# Patient Record
Sex: MALE | Race: WHITE | NOT HISPANIC OR LATINO | URBAN - NONMETROPOLITAN AREA
[De-identification: names, ages, dates, MRNs, and addresses within clinical notes are randomized per-mention and may not be internally consistent; named-entity substitution may affect disease eponyms.]

---

## 2022-01-13 ENCOUNTER — OFFICE VISIT (OUTPATIENT)
Dept: URBAN - NONMETROPOLITAN AREA CLINIC 2 | Facility: CLINIC | Age: 37
End: 2022-01-13
Payer: COMMERCIAL

## 2022-01-13 ENCOUNTER — WEB ENCOUNTER (OUTPATIENT)
Dept: URBAN - NONMETROPOLITAN AREA CLINIC 2 | Facility: CLINIC | Age: 37
End: 2022-01-13

## 2022-01-13 DIAGNOSIS — R10.11 RIGHT UPPER QUADRANT ABDOMINAL PAIN: ICD-10-CM

## 2022-01-13 DIAGNOSIS — R93.3 ABNORMAL CT SCAN, SIGMOID COLON: ICD-10-CM

## 2022-01-13 DIAGNOSIS — K92.1 HEMATOCHEZIA: ICD-10-CM

## 2022-01-13 PROCEDURE — 99204 OFFICE O/P NEW MOD 45 MIN: CPT | Performed by: INTERNAL MEDICINE

## 2022-01-13 RX ORDER — ONDANSETRON 4 MG/1
PLACE ONE TABLET ON THE TONGUE THREE TIMES A DAY AS NEEDED FOR NAUSEA TABLET, ORALLY DISINTEGRATING ORAL
Qty: 30 | Refills: 0 | Status: ACTIVE | COMMUNITY

## 2022-01-13 RX ORDER — CIPROFLOXACIN 500 MG/1
TAKE ONE TABLET BY MOUTH TWICE A DAY FOR 7 DAYS TABLET, FILM COATED ORAL
Qty: 14 | Refills: 0 | Status: ACTIVE | COMMUNITY

## 2022-01-13 RX ORDER — AMLODIPINE BESYLATE 10 MG/1
TAKE ONE TABLET BY MOUTH ONE TIME DAILY TABLET ORAL
Qty: 90 | Refills: 0 | Status: ACTIVE | COMMUNITY

## 2022-01-13 RX ORDER — IBUPROFEN 800 MG/1
TAKE ONE TABLET BY MOUTH EVERY 8 HOURS AS NEEDED TABLET, FILM COATED ORAL
Qty: 15 | Refills: 0 | Status: ACTIVE | COMMUNITY

## 2022-01-13 RX ORDER — PREDNISONE 20 MG/1
2 TABLETS TABLET ORAL ONCE A DAY
Qty: 60 TABLET | Refills: 3 | OUTPATIENT
Start: 2022-01-13 | End: 2022-05-13

## 2022-01-13 RX ORDER — METRONIDAZOLE 500 MG/1
1 TABLET TABLET ORAL THREE TIMES A DAY
Qty: 30 | OUTPATIENT
Start: 2022-01-13 | End: 2022-01-23

## 2022-01-13 RX ORDER — HYDROCODONE BITARTRATE AND ACETAMINOPHEN 7.5; 325 MG/1; MG/1
TAKE ONE TABLET BY MOUTH EVERY 4 HOURS AS NEEDED PAIN TABLET ORAL
Qty: 15 | Refills: 0 | Status: ACTIVE | COMMUNITY

## 2022-01-13 RX ORDER — CIPROFLOXACIN HYDROCHLORIDE 500 MG/1
1 TABLET TABLET, FILM COATED ORAL
Qty: 20 | OUTPATIENT
Start: 2022-01-13 | End: 2022-01-23

## 2022-01-13 RX ORDER — PREDNISONE 20 MG/1
TAKE ONE TABLET BY MOUTH ONE TIME DAILY FOR 5 DAYS TABLET ORAL
Qty: 5 | Refills: 0 | Status: ACTIVE | COMMUNITY

## 2022-01-13 RX ORDER — TIZANIDINE 4 MG/1
TAKE ONE TABLET BY MOUTH AT BEDTIME TABLET ORAL
Qty: 90 | Refills: 0 | Status: ACTIVE | COMMUNITY

## 2022-01-13 RX ORDER — PROMETHAZINE HYDROCHLORIDE 25 MG/1
TAKE ONE TABLET BY MOUTH EVERY 6 HOURS AS NEEDED TABLET ORAL
Qty: 20 | Refills: 0 | Status: ACTIVE | COMMUNITY

## 2022-01-13 RX ORDER — SUCRALFATE 1 G/1
TAKE ONE TABLET BY MOUTH FOUR TIMES A DAY WITH MEALS AND NIGHTLY FOR 14 DAYS TABLET ORAL
Qty: 56 | Refills: 0 | Status: ACTIVE | COMMUNITY

## 2022-01-13 RX ORDER — METRONIDAZOLE 500 MG/1
TAKE ONE TABLET BY MOUTH THREE TIMES A DAY TABLET, FILM COATED ORAL
Qty: 21 | Refills: 0 | Status: ACTIVE | COMMUNITY

## 2022-01-13 RX ORDER — PANTOPRAZOLE SODIUM 40 MG/1
TAKE ONE TABLET BY MOUTH ONE TIME DAILY TABLET, DELAYED RELEASE ORAL
Qty: 30 | Refills: 0 | Status: ACTIVE | COMMUNITY

## 2022-01-13 RX ORDER — AZITHROMYCIN 250 MG/1
TAKE TWO TABLETS BY MOUTH ON DAY 1, THEN TAKE ONE TABLET ONE TIME DAILY ON DAYS 2-5 TABLET, FILM COATED ORAL
Qty: 6 | Refills: 0 | Status: ACTIVE | COMMUNITY

## 2022-01-13 RX ORDER — LISINOPRIL 10 MG/1
TAKE ONE TABLET BY MOUTH ONE TIME DAILY TABLET ORAL
Qty: 90 | Refills: 0 | Status: ACTIVE | COMMUNITY

## 2022-01-13 NOTE — HPI-TODAY'S VISIT:
Mr. Lance Fletcher is a 35 y/o M referred to GI clinic by [facility] ER for evaluation of abdominal pain, hematochezia, and abnormal CT scan with possible sigmoid colitis.  He presented there in mid October and November with rectal bleeding.  He was given antibiotics which he reports helps for a while but symptoms tend to recur.  Currently he is having blood in almost all his bowel movements and right sided abdominal pain.  He is moving his bowels fairly frequently, sometimes as much as 4-5x daily and they are loose.  Labs in the ER showed normal CBC and CMP.

## 2022-01-26 ENCOUNTER — OFFICE VISIT (OUTPATIENT)
Dept: URBAN - METROPOLITAN AREA MEDICAL CENTER 1 | Facility: MEDICAL CENTER | Age: 37
End: 2022-01-26
Payer: COMMERCIAL

## 2022-01-26 DIAGNOSIS — R93.3 ABN FINDINGS-GI TRACT: ICD-10-CM

## 2022-01-26 DIAGNOSIS — K31.89 ACQUIRED DEFORMITY OF DUODENUM: ICD-10-CM

## 2022-01-26 DIAGNOSIS — K62.1 ANAL AND RECTAL POLYP: ICD-10-CM

## 2022-01-26 DIAGNOSIS — K62.5 ANAL BLEEDING: ICD-10-CM

## 2022-01-26 DIAGNOSIS — K20.80 ESOPHAGITIS DISSECANS SUPERFICIALIS: ICD-10-CM

## 2022-01-26 DIAGNOSIS — K63.89 BACTERIAL OVERGROWTH SYNDROME: ICD-10-CM

## 2022-01-26 DIAGNOSIS — K29.60 ADENOPAPILLOMATOSIS GASTRICA: ICD-10-CM

## 2022-01-26 DIAGNOSIS — R19.7 ACUTE DIARRHEA: ICD-10-CM

## 2022-01-26 PROCEDURE — 43239 EGD BIOPSY SINGLE/MULTIPLE: CPT | Performed by: INTERNAL MEDICINE

## 2022-01-26 PROCEDURE — 45380 COLONOSCOPY AND BIOPSY: CPT | Performed by: INTERNAL MEDICINE

## 2022-01-26 PROCEDURE — 45385 COLONOSCOPY W/LESION REMOVAL: CPT | Performed by: INTERNAL MEDICINE

## 2022-01-28 ENCOUNTER — TELEPHONE ENCOUNTER (OUTPATIENT)
Dept: URBAN - NONMETROPOLITAN AREA CLINIC 2 | Facility: CLINIC | Age: 37
End: 2022-01-28

## 2022-02-14 ENCOUNTER — TELEPHONE ENCOUNTER (OUTPATIENT)
Dept: URBAN - NONMETROPOLITAN AREA CLINIC 2 | Facility: CLINIC | Age: 37
End: 2022-02-14

## 2022-02-14 RX ORDER — HYDROCORTISONE ACETATE 25 MG/1
1 SUPPOSITORY SUPPOSITORY RECTAL ONCE A DAY
Qty: 14 SUPPOSITORY | Refills: 2 | OUTPATIENT
Start: 2022-02-14 | End: 2022-03-28

## 2022-02-15 ENCOUNTER — WEB ENCOUNTER (OUTPATIENT)
Dept: URBAN - NONMETROPOLITAN AREA CLINIC 2 | Facility: CLINIC | Age: 37
End: 2022-02-15

## 2022-02-16 ENCOUNTER — WEB ENCOUNTER (OUTPATIENT)
Dept: URBAN - NONMETROPOLITAN AREA CLINIC 2 | Facility: CLINIC | Age: 37
End: 2022-02-16

## 2022-02-17 ENCOUNTER — WEB ENCOUNTER (OUTPATIENT)
Dept: URBAN - NONMETROPOLITAN AREA CLINIC 2 | Facility: CLINIC | Age: 37
End: 2022-02-17

## 2022-02-18 ENCOUNTER — WEB ENCOUNTER (OUTPATIENT)
Dept: URBAN - NONMETROPOLITAN AREA CLINIC 2 | Facility: CLINIC | Age: 37
End: 2022-02-18

## 2022-02-21 ENCOUNTER — TELEPHONE ENCOUNTER (OUTPATIENT)
Dept: URBAN - NONMETROPOLITAN AREA CLINIC 2 | Facility: CLINIC | Age: 37
End: 2022-02-21

## 2022-02-21 ENCOUNTER — WEB ENCOUNTER (OUTPATIENT)
Dept: URBAN - NONMETROPOLITAN AREA CLINIC 2 | Facility: CLINIC | Age: 37
End: 2022-02-21

## 2022-02-22 ENCOUNTER — WEB ENCOUNTER (OUTPATIENT)
Dept: URBAN - NONMETROPOLITAN AREA CLINIC 2 | Facility: CLINIC | Age: 37
End: 2022-02-22

## 2022-02-24 ENCOUNTER — LAB OUTSIDE AN ENCOUNTER (OUTPATIENT)
Dept: URBAN - NONMETROPOLITAN AREA CLINIC 2 | Facility: CLINIC | Age: 37
End: 2022-02-24

## 2022-03-01 ENCOUNTER — WEB ENCOUNTER (OUTPATIENT)
Dept: URBAN - NONMETROPOLITAN AREA CLINIC 2 | Facility: CLINIC | Age: 37
End: 2022-03-01

## 2022-03-01 ENCOUNTER — WEB ENCOUNTER (OUTPATIENT)
Dept: URBAN - METROPOLITAN AREA CLINIC 92 | Facility: CLINIC | Age: 37
End: 2022-03-01

## 2022-03-01 LAB
C. DIFFICILE TOXIN A/B, STOOL - QDX: NEGATIVE
GASTROINTESTINAL PATHOGEN: (no result)
OVA AND PARASITE - QDX: (no result)

## 2022-03-04 ENCOUNTER — OFFICE VISIT (OUTPATIENT)
Dept: URBAN - NONMETROPOLITAN AREA CLINIC 2 | Facility: CLINIC | Age: 37
End: 2022-03-04
Payer: COMMERCIAL

## 2022-03-04 VITALS
DIASTOLIC BLOOD PRESSURE: 79 MMHG | TEMPERATURE: 97 F | HEIGHT: 70 IN | SYSTOLIC BLOOD PRESSURE: 130 MMHG | WEIGHT: 229.8 LBS | BODY MASS INDEX: 32.9 KG/M2 | HEART RATE: 93 BPM

## 2022-03-04 DIAGNOSIS — R11.0 NAUSEA: ICD-10-CM

## 2022-03-04 DIAGNOSIS — K52.9 COLITIS: ICD-10-CM

## 2022-03-04 DIAGNOSIS — E72.20 HYPERAMMONEMIA: ICD-10-CM

## 2022-03-04 DIAGNOSIS — R10.9 FLANK PAIN: ICD-10-CM

## 2022-03-04 PROCEDURE — 99214 OFFICE O/P EST MOD 30 MIN: CPT | Performed by: NURSE PRACTITIONER

## 2022-03-04 RX ORDER — PANTOPRAZOLE SODIUM 40 MG/1
1 TABLET TABLET, DELAYED RELEASE ORAL TWICE DAILY
Qty: 60 TABLET | Refills: 6 | OUTPATIENT
Start: 2022-03-04

## 2022-03-04 RX ORDER — HYDROCORTISONE ACETATE 25 MG/1
1 SUPPOSITORY SUPPOSITORY RECTAL ONCE A DAY
Qty: 14 SUPPOSITORY | Refills: 2 | Status: ACTIVE | COMMUNITY
Start: 2022-02-14 | End: 2022-03-28

## 2022-03-04 RX ORDER — TIZANIDINE 4 MG/1
TAKE ONE TABLET BY MOUTH AT BEDTIME TABLET ORAL
Qty: 90 | Refills: 0 | Status: ACTIVE | COMMUNITY

## 2022-03-04 RX ORDER — LISINOPRIL 10 MG/1
TAKE ONE TABLET BY MOUTH ONE TIME DAILY TABLET ORAL
Qty: 90 | Refills: 0 | Status: ACTIVE | COMMUNITY

## 2022-03-04 RX ORDER — AZITHROMYCIN 250 MG/1
TAKE TWO TABLETS BY MOUTH ON DAY 1, THEN TAKE ONE TABLET ONE TIME DAILY ON DAYS 2-5 TABLET, FILM COATED ORAL
Qty: 6 | Refills: 0 | Status: ACTIVE | COMMUNITY

## 2022-03-04 RX ORDER — IBUPROFEN 800 MG/1
TAKE ONE TABLET BY MOUTH EVERY 8 HOURS AS NEEDED TABLET, FILM COATED ORAL
Qty: 15 | Refills: 0 | Status: ACTIVE | COMMUNITY

## 2022-03-04 RX ORDER — CYCLOBENZAPRINE HYDROCHLORIDE 5 MG/1
TAKE ONCE DAILY PRN SIDE/BACK PAIN TABLET, FILM COATED ORAL ONCE A DAY
Qty: 30 | Refills: 1 | OUTPATIENT

## 2022-03-04 RX ORDER — PANTOPRAZOLE SODIUM 40 MG/1
TAKE ONE TABLET BY MOUTH ONE TIME DAILY TABLET, DELAYED RELEASE ORAL
Qty: 30 | Refills: 0 | Status: ACTIVE | COMMUNITY

## 2022-03-04 RX ORDER — ONDANSETRON 4 MG/1
PLACE ONE TABLET ON THE TONGUE THREE TIMES A DAY AS NEEDED FOR NAUSEA TABLET, ORALLY DISINTEGRATING ORAL
Qty: 30 | Refills: 0 | Status: ACTIVE | COMMUNITY

## 2022-03-04 RX ORDER — AMITRIPTYLINE HYDROCHLORIDE 10 MG/1
1 TABLET AT BEDTIME TABLET, FILM COATED ORAL ONCE A DAY
Qty: 30 | OUTPATIENT
Start: 2022-03-04

## 2022-03-04 RX ORDER — PROMETHAZINE HYDROCHLORIDE 25 MG/1
TAKE ONE TABLET BY MOUTH EVERY 6 HOURS AS NEEDED TABLET ORAL
Qty: 20 | Refills: 0 | Status: ACTIVE | COMMUNITY

## 2022-03-04 RX ORDER — PREDNISONE 20 MG/1
2 TABLETS TABLET ORAL ONCE A DAY
Qty: 60 TABLET | Refills: 3 | Status: ACTIVE | COMMUNITY
Start: 2022-01-13 | End: 2022-05-13

## 2022-03-04 RX ORDER — HYDROCODONE BITARTRATE AND ACETAMINOPHEN 7.5; 325 MG/1; MG/1
TAKE ONE TABLET BY MOUTH EVERY 4 HOURS AS NEEDED PAIN TABLET ORAL
Qty: 15 | Refills: 0 | Status: ACTIVE | COMMUNITY

## 2022-03-04 RX ORDER — SUCRALFATE 1 G/1
TAKE ONE TABLET BY MOUTH FOUR TIMES A DAY WITH MEALS AND NIGHTLY FOR 14 DAYS TABLET ORAL
Qty: 56 | Refills: 0 | Status: ACTIVE | COMMUNITY

## 2022-03-04 RX ORDER — AMLODIPINE BESYLATE 10 MG/1
TAKE ONE TABLET BY MOUTH ONE TIME DAILY TABLET ORAL
Qty: 90 | Refills: 0 | Status: ACTIVE | COMMUNITY

## 2022-03-04 NOTE — HPI-TODAY'S VISIT:
Consult: Mr. Lance Fletcher is a 35 y/o M referred to GI clinic by [facility] ER for evaluation of abdominal pain, hematochezia, and abnormal CT scan with possible sigmoid colitis.  He presented there in mid October and November with rectal bleeding.  He was given antibiotics which he reports helps for a while but symptoms tend to recur.  Currently he is having blood in almost all his bowel movements and right sided abdominal pain.  He is moving his bowels fairly frequently, sometimes as much as 4-5x daily and they are loose.  Labs in the ER showed normal CBC and CMP. 3/4/2022 Lance returns for follow-up for colitis.  Since his last office visit, he is gone to the ED as well as had some urgent care visits with a negative STD and hepatitis work-up.  He does report during one ER visit, he had an elevated ammonia 46.  He continues to have intermittent bouts of rectal bleeding with loose stools.  This will be accompanied by some flank pain and brain fog as well as nausea.  Symptoms resolved with antibiotics.  Most of his brain fog and nausea seem to be related to antibiotics. Sb

## 2022-03-05 ENCOUNTER — WEB ENCOUNTER (OUTPATIENT)
Dept: URBAN - NONMETROPOLITAN AREA CLINIC 2 | Facility: CLINIC | Age: 37
End: 2022-03-05

## 2022-03-05 LAB
A/G RATIO: 2.6
ALBUMIN: 5
ALKALINE PHOSPHATASE: 72
ALT (SGPT): 60
AMMONIA, PLASMA: 52
AST (SGOT): 27
BILIRUBIN, TOTAL: 0.3
BUN/CREATININE RATIO: 18
BUN: 16
CALCIUM: 9.9
CARBON DIOXIDE, TOTAL: 18
CHLORIDE: 102
CREATININE: 0.9
EGFR: 114
GLOBULIN, TOTAL: 1.9
GLUCOSE: 132
POTASSIUM: 4.7
PROTEIN, TOTAL: 6.9
SODIUM: 136

## 2022-03-06 ENCOUNTER — WEB ENCOUNTER (OUTPATIENT)
Dept: URBAN - NONMETROPOLITAN AREA CLINIC 2 | Facility: CLINIC | Age: 37
End: 2022-03-06

## 2022-03-07 ENCOUNTER — TELEPHONE ENCOUNTER (OUTPATIENT)
Dept: URBAN - METROPOLITAN AREA CLINIC 23 | Facility: CLINIC | Age: 37
End: 2022-03-07

## 2022-03-08 ENCOUNTER — WEB ENCOUNTER (OUTPATIENT)
Dept: URBAN - NONMETROPOLITAN AREA CLINIC 2 | Facility: CLINIC | Age: 37
End: 2022-03-08

## 2022-03-09 ENCOUNTER — WEB ENCOUNTER (OUTPATIENT)
Dept: URBAN - NONMETROPOLITAN AREA CLINIC 2 | Facility: CLINIC | Age: 37
End: 2022-03-09

## 2022-03-09 ENCOUNTER — WEB ENCOUNTER (OUTPATIENT)
Dept: URBAN - NONMETROPOLITAN AREA CLINIC 13 | Facility: CLINIC | Age: 37
End: 2022-03-09

## 2022-03-18 ENCOUNTER — WEB ENCOUNTER (OUTPATIENT)
Dept: URBAN - NONMETROPOLITAN AREA CLINIC 2 | Facility: CLINIC | Age: 37
End: 2022-03-18

## 2022-03-19 ENCOUNTER — WEB ENCOUNTER (OUTPATIENT)
Dept: URBAN - NONMETROPOLITAN AREA CLINIC 2 | Facility: CLINIC | Age: 37
End: 2022-03-19

## 2022-03-21 ENCOUNTER — WEB ENCOUNTER (OUTPATIENT)
Dept: URBAN - NONMETROPOLITAN AREA CLINIC 2 | Facility: CLINIC | Age: 37
End: 2022-03-21

## 2022-03-21 RX ORDER — METRONIDAZOLE 500 MG/1
1 TABLET TABLET, FILM COATED ORAL THREE TIMES A DAY
Qty: 42 TABLET | Refills: 0 | OUTPATIENT
Start: 2022-03-23 | End: 2022-04-06

## 2022-03-21 RX ORDER — CIPROFLOXACIN HYDROCHLORIDE 500 MG/1
1 TABLET TABLET, FILM COATED ORAL
Qty: 28 TABLET | Refills: 0 | OUTPATIENT
Start: 2022-03-23 | End: 2022-04-06

## 2022-03-23 ENCOUNTER — WEB ENCOUNTER (OUTPATIENT)
Dept: URBAN - NONMETROPOLITAN AREA CLINIC 2 | Facility: CLINIC | Age: 37
End: 2022-03-23

## 2022-03-24 ENCOUNTER — WEB ENCOUNTER (OUTPATIENT)
Dept: URBAN - NONMETROPOLITAN AREA CLINIC 2 | Facility: CLINIC | Age: 37
End: 2022-03-24

## 2022-03-25 ENCOUNTER — WEB ENCOUNTER (OUTPATIENT)
Dept: URBAN - NONMETROPOLITAN AREA CLINIC 2 | Facility: CLINIC | Age: 37
End: 2022-03-25

## 2022-03-26 ENCOUNTER — WEB ENCOUNTER (OUTPATIENT)
Dept: URBAN - NONMETROPOLITAN AREA CLINIC 2 | Facility: CLINIC | Age: 37
End: 2022-03-26

## 2022-03-27 ENCOUNTER — WEB ENCOUNTER (OUTPATIENT)
Dept: URBAN - NONMETROPOLITAN AREA CLINIC 2 | Facility: CLINIC | Age: 37
End: 2022-03-27

## 2022-03-30 LAB
C DIFFICILE TOXIN GENE NAA: NEGATIVE
C DIFFICILE TOXINS A+B, EIA: NEGATIVE
CALPROTECTIN, FECAL: 613
CAMPYLOBACTER CULTURE: (no result)
E COLI SHIGA TOXIN EIA: NEGATIVE
Lab: (no result)
Lab: (no result)
SALMONELLA/SHIGELLA SCREEN: (no result)

## 2022-03-31 ENCOUNTER — WEB ENCOUNTER (OUTPATIENT)
Dept: URBAN - NONMETROPOLITAN AREA CLINIC 2 | Facility: CLINIC | Age: 37
End: 2022-03-31

## 2022-03-31 ENCOUNTER — TELEPHONE ENCOUNTER (OUTPATIENT)
Dept: URBAN - METROPOLITAN AREA CLINIC 92 | Facility: CLINIC | Age: 37
End: 2022-03-31

## 2022-03-31 RX ORDER — MESALAMINE 375 MG/1
4 CAPSULES IN THE MORNING CAPSULE, EXTENDED RELEASE ORAL ONCE A DAY
Qty: 120 | OUTPATIENT
Start: 2022-03-31 | End: 2022-04-30

## 2022-04-02 ENCOUNTER — WEB ENCOUNTER (OUTPATIENT)
Dept: URBAN - NONMETROPOLITAN AREA CLINIC 2 | Facility: CLINIC | Age: 37
End: 2022-04-02

## 2022-04-03 ENCOUNTER — WEB ENCOUNTER (OUTPATIENT)
Dept: URBAN - NONMETROPOLITAN AREA CLINIC 2 | Facility: CLINIC | Age: 37
End: 2022-04-03

## 2022-04-04 ENCOUNTER — WEB ENCOUNTER (OUTPATIENT)
Dept: URBAN - NONMETROPOLITAN AREA CLINIC 2 | Facility: CLINIC | Age: 37
End: 2022-04-04

## 2022-04-05 ENCOUNTER — WEB ENCOUNTER (OUTPATIENT)
Dept: URBAN - NONMETROPOLITAN AREA CLINIC 2 | Facility: CLINIC | Age: 37
End: 2022-04-05

## 2022-04-06 ENCOUNTER — WEB ENCOUNTER (OUTPATIENT)
Dept: URBAN - NONMETROPOLITAN AREA CLINIC 2 | Facility: CLINIC | Age: 37
End: 2022-04-06

## 2022-04-14 ENCOUNTER — OFFICE VISIT (OUTPATIENT)
Dept: URBAN - NONMETROPOLITAN AREA CLINIC 2 | Facility: CLINIC | Age: 37
End: 2022-04-14
Payer: COMMERCIAL

## 2022-04-14 DIAGNOSIS — R10.9 FLANK PAIN: ICD-10-CM

## 2022-04-14 DIAGNOSIS — K52.9 COLITIS: ICD-10-CM

## 2022-04-14 DIAGNOSIS — R11.0 NAUSEA: ICD-10-CM

## 2022-04-14 DIAGNOSIS — E72.20 HYPERAMMONEMIA: ICD-10-CM

## 2022-04-14 PROBLEM — 9360008: Status: ACTIVE | Noted: 2022-03-04

## 2022-04-14 PROCEDURE — 99214 OFFICE O/P EST MOD 30 MIN: CPT | Performed by: INTERNAL MEDICINE

## 2022-04-14 RX ORDER — CYCLOBENZAPRINE HYDROCHLORIDE 5 MG/1
TAKE ONCE DAILY PRN SIDE/BACK PAIN TABLET, FILM COATED ORAL ONCE A DAY
Qty: 30 | Refills: 1 | Status: ACTIVE | COMMUNITY

## 2022-04-14 RX ORDER — LISINOPRIL 10 MG/1
TAKE ONE TABLET BY MOUTH ONE TIME DAILY TABLET ORAL
Qty: 90 | Refills: 0 | Status: ACTIVE | COMMUNITY

## 2022-04-14 RX ORDER — PANTOPRAZOLE SODIUM 40 MG/1
1 TABLET TABLET, DELAYED RELEASE ORAL TWICE DAILY
Qty: 60 TABLET | Refills: 6 | Status: ACTIVE | COMMUNITY
Start: 2022-03-04

## 2022-04-14 RX ORDER — AMITRIPTYLINE HYDROCHLORIDE 10 MG/1
1 TABLET AT BEDTIME TABLET, FILM COATED ORAL ONCE A DAY
Qty: 30 | Status: ACTIVE | COMMUNITY
Start: 2022-03-04

## 2022-04-14 RX ORDER — MESALAMINE 375 MG/1
4 CAPSULES IN THE MORNING CAPSULE, EXTENDED RELEASE ORAL ONCE A DAY
Qty: 120 | Status: ACTIVE | COMMUNITY
Start: 2022-03-31 | End: 2022-04-30

## 2022-04-14 RX ORDER — AMLODIPINE BESYLATE 10 MG/1
TAKE ONE TABLET BY MOUTH ONE TIME DAILY TABLET ORAL
Qty: 90 | Refills: 0 | Status: ACTIVE | COMMUNITY

## 2022-04-14 RX ORDER — IBUPROFEN 800 MG/1
TAKE ONE TABLET BY MOUTH EVERY 8 HOURS AS NEEDED TABLET, FILM COATED ORAL
Qty: 15 | Refills: 0 | Status: ACTIVE | COMMUNITY

## 2022-04-14 RX ORDER — PANTOPRAZOLE SODIUM 40 MG/1
1 TABLET TABLET, DELAYED RELEASE ORAL TWICE DAILY
Qty: 60 TABLET | Refills: 6 | OUTPATIENT

## 2022-04-14 RX ORDER — PROMETHAZINE HYDROCHLORIDE 25 MG/1
TAKE ONE TABLET BY MOUTH EVERY 6 HOURS AS NEEDED TABLET ORAL
Qty: 20 | Refills: 0 | Status: ACTIVE | COMMUNITY

## 2022-04-14 RX ORDER — HYDROCODONE BITARTRATE AND ACETAMINOPHEN 7.5; 325 MG/1; MG/1
TAKE ONE TABLET BY MOUTH EVERY 4 HOURS AS NEEDED PAIN TABLET ORAL
Qty: 15 | Refills: 0 | Status: ACTIVE | COMMUNITY

## 2022-04-14 RX ORDER — TIZANIDINE 4 MG/1
TAKE ONE TABLET BY MOUTH AT BEDTIME TABLET ORAL
Qty: 90 | Refills: 0 | Status: ACTIVE | COMMUNITY

## 2022-04-14 RX ORDER — ONDANSETRON 4 MG/1
PLACE ONE TABLET ON THE TONGUE THREE TIMES A DAY AS NEEDED FOR NAUSEA TABLET, ORALLY DISINTEGRATING ORAL
Qty: 30 | Refills: 0 | Status: ACTIVE | COMMUNITY

## 2022-04-14 RX ORDER — PREDNISONE 20 MG/1
2 TABLETS TABLET ORAL ONCE A DAY
Qty: 60 TABLET | Refills: 3 | Status: ACTIVE | COMMUNITY
Start: 2022-01-13 | End: 2022-05-13

## 2022-04-14 RX ORDER — SUCRALFATE 1 G/1
TAKE ONE TABLET BY MOUTH FOUR TIMES A DAY WITH MEALS AND NIGHTLY FOR 14 DAYS TABLET ORAL
Qty: 56 | Refills: 0 | Status: ACTIVE | COMMUNITY

## 2022-04-14 RX ORDER — AMITRIPTYLINE HYDROCHLORIDE 10 MG/1
1 TABLET AT BEDTIME TABLET, FILM COATED ORAL ONCE A DAY
Qty: 30 | OUTPATIENT

## 2022-04-14 RX ORDER — CYCLOBENZAPRINE HYDROCHLORIDE 5 MG/1
TAKE ONCE DAILY PRN SIDE/BACK PAIN TABLET, FILM COATED ORAL ONCE A DAY
Qty: 30 | Refills: 1 | OUTPATIENT

## 2022-04-14 NOTE — HPI-TODAY'S VISIT:
Consult: Mr. Lance Fletcher is a 37 y/o M referred to GI clinic by [facility] ER for evaluation of abdominal pain, hematochezia, and abnormal CT scan with possible sigmoid colitis.  He presented there in mid October and November with rectal bleeding.  He was given antibiotics which he reports helps for a while but symptoms tend to recur.  Currently he is having blood in almost all his bowel movements and right sided abdominal pain.  He is moving his bowels fairly frequently, sometimes as much as 4-5x daily and they are loose.  Labs in the ER showed normal CBC and CMP. 3/4/2022 Lance returns for follow-up for colitis.  Since his last office visit, he is gone to the ED as well as had some urgent care visits with a negative STD and hepatitis work-up.  He does report during one ER visit, he had an elevated ammonia 46.  He continues to have intermittent bouts of rectal bleeding with loose stools.  This will be accompanied by some flank pain and brain fog as well as nausea.  Symptoms resolved with antibiotics.  Most of his brain fog and nausea seem to be related to antibiotics. Sb  4/14/22: Lance returns for follow-up of abdominal pain and sigmoid colitis.  Since his last clinic visit, he was given antibiotics and started on mesalamine for ongoing symptoms.  He went to the ER for severe bilateral flank and epigastric abdominal pain on 4/5/22.  This was reviewed today. There he had a CT and labwork.  Labs showed normal CBC, CMP.  He did have a mildly elevated Hgb at 18.  His CT showed chronic sigmoid thickening, otherwise normal.  Today he reports he is again doing well but he notes this happens when he finishes a course of abx and is worried symptoms will recur.

## 2022-04-14 NOTE — HPI-OTHER HISTORIES
2/24/22 Neg GPP  ammonia 46 liver enzymes WNL except for ALT 53  1/26/22 EGD WNL gastritis and esophagitis on bx  1/26/22 rectal polyp, patchy erythema rectosigmoid that did not have appearance of UC path with hyperplastic polyp and  mild nonspecific inflammation  10/21 CT Sigmoid colon wall is mildly thickened and there is mild basal congestion around the sigmoid colon suggesting infectious or inflammatory disease but is nonspecific 11/21 CT sigmoid colitis

## 2022-04-18 ENCOUNTER — WEB ENCOUNTER (OUTPATIENT)
Dept: URBAN - NONMETROPOLITAN AREA CLINIC 2 | Facility: CLINIC | Age: 37
End: 2022-04-18

## 2022-04-21 ENCOUNTER — WEB ENCOUNTER (OUTPATIENT)
Dept: URBAN - NONMETROPOLITAN AREA CLINIC 2 | Facility: CLINIC | Age: 37
End: 2022-04-21

## 2022-04-25 ENCOUNTER — WEB ENCOUNTER (OUTPATIENT)
Dept: URBAN - NONMETROPOLITAN AREA CLINIC 2 | Facility: CLINIC | Age: 37
End: 2022-04-25

## 2022-04-28 ENCOUNTER — WEB ENCOUNTER (OUTPATIENT)
Dept: URBAN - NONMETROPOLITAN AREA CLINIC 2 | Facility: CLINIC | Age: 37
End: 2022-04-28

## 2022-05-02 ENCOUNTER — TELEPHONE ENCOUNTER (OUTPATIENT)
Dept: URBAN - NONMETROPOLITAN AREA CLINIC 2 | Facility: CLINIC | Age: 37
End: 2022-05-02

## 2022-05-03 ENCOUNTER — TELEPHONE ENCOUNTER (OUTPATIENT)
Dept: URBAN - METROPOLITAN AREA CLINIC 92 | Facility: CLINIC | Age: 37
End: 2022-05-03

## 2022-05-03 ENCOUNTER — CLAIMS CREATED FROM THE CLAIM WINDOW (OUTPATIENT)
Dept: URBAN - METROPOLITAN AREA CLINIC 4 | Facility: CLINIC | Age: 37
End: 2022-05-03
Payer: COMMERCIAL

## 2022-05-03 ENCOUNTER — WEB ENCOUNTER (OUTPATIENT)
Dept: URBAN - NONMETROPOLITAN AREA CLINIC 2 | Facility: CLINIC | Age: 37
End: 2022-05-03

## 2022-05-03 ENCOUNTER — OFFICE VISIT (OUTPATIENT)
Dept: URBAN - NONMETROPOLITAN AREA SURGERY CENTER 1 | Facility: SURGERY CENTER | Age: 37
End: 2022-05-03
Payer: COMMERCIAL

## 2022-05-03 DIAGNOSIS — K52.3 INDETERMINATE COLITIS: ICD-10-CM

## 2022-05-03 DIAGNOSIS — K52.3 COLITIS, INDETERMINATE: ICD-10-CM

## 2022-05-03 DIAGNOSIS — K60.2 ACUTE ANAL FISSURE: ICD-10-CM

## 2022-05-03 DIAGNOSIS — R19.7 ACUTE DIARRHEA: ICD-10-CM

## 2022-05-03 DIAGNOSIS — K62.5 ANAL BLEEDING: ICD-10-CM

## 2022-05-03 PROCEDURE — G8907 PT DOC NO EVENTS ON DISCHARG: HCPCS | Performed by: INTERNAL MEDICINE

## 2022-05-03 PROCEDURE — 88305 TISSUE EXAM BY PATHOLOGIST: CPT | Performed by: PATHOLOGY

## 2022-05-03 PROCEDURE — 45331 SIGMOIDOSCOPY AND BIOPSY: CPT | Performed by: INTERNAL MEDICINE

## 2022-05-03 RX ORDER — PANTOPRAZOLE SODIUM 40 MG/1
1 TABLET TABLET, DELAYED RELEASE ORAL TWICE DAILY
Qty: 60 TABLET | Refills: 6 | Status: ACTIVE | COMMUNITY

## 2022-05-03 RX ORDER — LISINOPRIL 10 MG/1
TAKE ONE TABLET BY MOUTH ONE TIME DAILY TABLET ORAL
Qty: 90 | Refills: 0 | Status: ACTIVE | COMMUNITY

## 2022-05-03 RX ORDER — AMLODIPINE BESYLATE 10 MG/1
TAKE ONE TABLET BY MOUTH ONE TIME DAILY TABLET ORAL
Qty: 90 | Refills: 0 | Status: ACTIVE | COMMUNITY

## 2022-05-03 RX ORDER — SUCRALFATE 1 G/1
TAKE ONE TABLET BY MOUTH FOUR TIMES A DAY WITH MEALS AND NIGHTLY FOR 14 DAYS TABLET ORAL
Qty: 56 | Refills: 0 | Status: ACTIVE | COMMUNITY

## 2022-05-03 RX ORDER — IBUPROFEN 800 MG/1
TAKE ONE TABLET BY MOUTH EVERY 8 HOURS AS NEEDED TABLET, FILM COATED ORAL
Qty: 15 | Refills: 0 | Status: ACTIVE | COMMUNITY

## 2022-05-03 RX ORDER — PREDNISONE 20 MG/1
2 TABLETS TABLET ORAL ONCE A DAY
Qty: 60 TABLET | Refills: 3 | Status: ACTIVE | COMMUNITY
Start: 2022-01-13 | End: 2022-05-13

## 2022-05-03 RX ORDER — ONDANSETRON 4 MG/1
PLACE ONE TABLET ON THE TONGUE THREE TIMES A DAY AS NEEDED FOR NAUSEA TABLET, ORALLY DISINTEGRATING ORAL
Qty: 30 | Refills: 0 | Status: ACTIVE | COMMUNITY

## 2022-05-03 RX ORDER — TIZANIDINE 4 MG/1
TAKE ONE TABLET BY MOUTH AT BEDTIME TABLET ORAL
Qty: 90 | Refills: 0 | Status: ACTIVE | COMMUNITY

## 2022-05-03 RX ORDER — CYCLOBENZAPRINE HYDROCHLORIDE 5 MG/1
TAKE ONCE DAILY PRN SIDE/BACK PAIN TABLET, FILM COATED ORAL ONCE A DAY
Qty: 30 | Refills: 1 | Status: ACTIVE | COMMUNITY

## 2022-05-03 RX ORDER — HYDROCODONE BITARTRATE AND ACETAMINOPHEN 7.5; 325 MG/1; MG/1
TAKE ONE TABLET BY MOUTH EVERY 4 HOURS AS NEEDED PAIN TABLET ORAL
Qty: 15 | Refills: 0 | Status: ACTIVE | COMMUNITY

## 2022-05-03 RX ORDER — PROMETHAZINE HYDROCHLORIDE 25 MG/1
TAKE ONE TABLET BY MOUTH EVERY 6 HOURS AS NEEDED TABLET ORAL
Qty: 20 | Refills: 0 | Status: ACTIVE | COMMUNITY

## 2022-05-03 RX ORDER — AMITRIPTYLINE HYDROCHLORIDE 10 MG/1
1 TABLET AT BEDTIME TABLET, FILM COATED ORAL ONCE A DAY
Qty: 30 | Status: ACTIVE | COMMUNITY

## 2022-05-03 NOTE — HPI-TODAY'S VISIT:
Discussed ongoing colitis and anal fissures with Lance and he agrees to start Humira.  Will go ahead and order labwork to accomodate this.

## 2022-05-05 LAB
A/G RATIO: 1.8
ALBUMIN: 4.8
ALKALINE PHOSPHATASE: 88
ALT (SGPT): 31
AST (SGOT): 23
BILIRUBIN, TOTAL: 0.6
BUN/CREATININE RATIO: 12
BUN: 12
C-REACTIVE PROTEIN, QUANT: 2
CALCIUM: 10
CARBON DIOXIDE, TOTAL: 18
CHLORIDE: 102
CREATININE: 0.99
EGFR: 101
GLOBULIN, TOTAL: 2.6
GLUCOSE: 74
HBSAG SCREEN: NEGATIVE
HCV AB: 0.1
HEMATOCRIT: 53.4
HEMOGLOBIN: 17.2
HEP A AB, IGM: NEGATIVE
HEP B CORE AB, IGM: NEGATIVE
INTERPRETATION:: (no result)
MCH: 29
MCHC: 32.2
MCV: 90
NRBC: (no result)
PLATELETS: 316
POTASSIUM: 4.3
PROTEIN, TOTAL: 7.4
QUANTIFERON CRITERIA: (no result)
QUANTIFERON INCUBATION: (no result)
QUANTIFERON MITOGEN VALUE: >10
QUANTIFERON NIL VALUE: 0
QUANTIFERON TB1 AG VALUE: 0
QUANTIFERON TB2 AG VALUE: 0
QUANTIFERON-TB GOLD PLUS: NEGATIVE
RBC: 5.94
RDW: 13.5
SEDIMENTATION RATE-WESTERGREN: 2
SODIUM: 142
WBC: 8

## 2022-05-06 ENCOUNTER — WEB ENCOUNTER (OUTPATIENT)
Dept: URBAN - NONMETROPOLITAN AREA CLINIC 2 | Facility: CLINIC | Age: 37
End: 2022-05-06

## 2022-05-06 RX ORDER — BUDESONIDE 3 MG/1
3 CAPSULES CAPSULE, COATED PELLETS ORAL ONCE A DAY
Qty: 90 CAPSULE | Refills: 3 | OUTPATIENT
Start: 2022-05-06

## 2022-05-06 RX ORDER — ADALIMUMAB 40MG/0.4ML
AS DIRECTED START DAY 28 KIT SUBCUTANEOUS EVERY OTHER WEEK
Qty: 2 PRE-FILLED PEN SYRINGE | Refills: 6 | OUTPATIENT
Start: 2022-05-06 | End: 2022-11-17

## 2022-05-06 RX ORDER — ADALIMUMAB 80MG/0.8ML
STARTER KIT. 160MG DAY 1 AND 80MG DAY 14 KIT SUBCUTANEOUS EVERY 2 WEEKS
Qty: 3 PRE-FILLED PEN SYRINGE | Refills: 0 | OUTPATIENT
Start: 2022-05-06 | End: 2022-06-03

## 2022-05-11 ENCOUNTER — WEB ENCOUNTER (OUTPATIENT)
Dept: URBAN - NONMETROPOLITAN AREA CLINIC 2 | Facility: CLINIC | Age: 37
End: 2022-05-11

## 2022-05-11 RX ORDER — MESALAMINE 375 MG/1
4 CAPSULES IN THE MORNING CAPSULE, EXTENDED RELEASE ORAL ONCE A DAY
Qty: 120 | Refills: 6 | OUTPATIENT
Start: 2022-05-12 | End: 2022-12-07

## 2022-05-12 ENCOUNTER — WEB ENCOUNTER (OUTPATIENT)
Dept: URBAN - NONMETROPOLITAN AREA CLINIC 2 | Facility: CLINIC | Age: 37
End: 2022-05-12

## 2022-05-13 ENCOUNTER — WEB ENCOUNTER (OUTPATIENT)
Dept: URBAN - NONMETROPOLITAN AREA CLINIC 2 | Facility: CLINIC | Age: 37
End: 2022-05-13

## 2022-05-16 ENCOUNTER — WEB ENCOUNTER (OUTPATIENT)
Dept: URBAN - NONMETROPOLITAN AREA CLINIC 2 | Facility: CLINIC | Age: 37
End: 2022-05-16

## 2022-05-17 ENCOUNTER — OFFICE VISIT (OUTPATIENT)
Dept: URBAN - METROPOLITAN AREA TELEHEALTH 2 | Facility: TELEHEALTH | Age: 37
End: 2022-05-17
Payer: COMMERCIAL

## 2022-05-17 DIAGNOSIS — R74.8 ELEVATED LIVER ENZYMES: ICD-10-CM

## 2022-05-17 DIAGNOSIS — K50.111 CROHN'S DISEASE OF COLON WITH RECTAL BLEEDING: ICD-10-CM

## 2022-05-17 PROCEDURE — 99213 OFFICE O/P EST LOW 20 MIN: CPT | Performed by: INTERNAL MEDICINE

## 2022-05-17 RX ORDER — LISINOPRIL 10 MG/1
TAKE ONE TABLET BY MOUTH ONE TIME DAILY TABLET ORAL
Qty: 90 | Refills: 0 | Status: ACTIVE | COMMUNITY

## 2022-05-17 RX ORDER — BUDESONIDE 3 MG/1
3 CAPSULES CAPSULE, COATED PELLETS ORAL ONCE A DAY
Qty: 90 CAPSULE | Refills: 3 | Status: ACTIVE | COMMUNITY
Start: 2022-05-06

## 2022-05-17 RX ORDER — PROMETHAZINE HYDROCHLORIDE 25 MG/1
TAKE ONE TABLET BY MOUTH EVERY 6 HOURS AS NEEDED TABLET ORAL
Qty: 20 | Refills: 0 | Status: ACTIVE | COMMUNITY

## 2022-05-17 RX ORDER — HYDROCODONE BITARTRATE AND ACETAMINOPHEN 7.5; 325 MG/1; MG/1
TAKE ONE TABLET BY MOUTH EVERY 4 HOURS AS NEEDED PAIN TABLET ORAL
Qty: 15 | Refills: 0 | Status: ACTIVE | COMMUNITY

## 2022-05-17 RX ORDER — AMITRIPTYLINE HYDROCHLORIDE 10 MG/1
1 TABLET AT BEDTIME TABLET, FILM COATED ORAL ONCE A DAY
Qty: 30 | Status: ACTIVE | COMMUNITY

## 2022-05-17 RX ORDER — CYCLOBENZAPRINE HYDROCHLORIDE 5 MG/1
TAKE ONCE DAILY PRN SIDE/BACK PAIN TABLET, FILM COATED ORAL ONCE A DAY
Qty: 30 | Refills: 1 | Status: ACTIVE | COMMUNITY

## 2022-05-17 RX ORDER — IBUPROFEN 800 MG/1
TAKE ONE TABLET BY MOUTH EVERY 8 HOURS AS NEEDED TABLET, FILM COATED ORAL
Qty: 15 | Refills: 0 | Status: ACTIVE | COMMUNITY

## 2022-05-17 RX ORDER — TIZANIDINE 4 MG/1
TAKE ONE TABLET BY MOUTH AT BEDTIME TABLET ORAL
Qty: 90 | Refills: 0 | Status: ACTIVE | COMMUNITY

## 2022-05-17 RX ORDER — ONDANSETRON 4 MG/1
PLACE ONE TABLET ON THE TONGUE THREE TIMES A DAY AS NEEDED FOR NAUSEA TABLET, ORALLY DISINTEGRATING ORAL
Qty: 30 | Refills: 0 | Status: ACTIVE | COMMUNITY

## 2022-05-17 RX ORDER — SUCRALFATE 1 G/1
TAKE ONE TABLET BY MOUTH FOUR TIMES A DAY WITH MEALS AND NIGHTLY FOR 14 DAYS TABLET ORAL
Qty: 56 | Refills: 0 | Status: ACTIVE | COMMUNITY

## 2022-05-17 RX ORDER — MESALAMINE 375 MG/1
4 CAPSULES IN THE MORNING CAPSULE, EXTENDED RELEASE ORAL ONCE A DAY
Qty: 120 | Refills: 6 | Status: ACTIVE | COMMUNITY
Start: 2022-05-12 | End: 2022-12-07

## 2022-05-17 RX ORDER — PANTOPRAZOLE SODIUM 40 MG/1
1 TABLET TABLET, DELAYED RELEASE ORAL TWICE DAILY
Qty: 60 TABLET | Refills: 6 | Status: ACTIVE | COMMUNITY

## 2022-05-17 RX ORDER — ADALIMUMAB 80MG/0.8ML
STARTER KIT. 160MG DAY 1 AND 80MG DAY 14 KIT SUBCUTANEOUS EVERY 2 WEEKS
Qty: 3 PRE-FILLED PEN SYRINGE | Refills: 0 | Status: ACTIVE | COMMUNITY
Start: 2022-05-06 | End: 2022-06-03

## 2022-05-17 RX ORDER — AMLODIPINE BESYLATE 10 MG/1
TAKE ONE TABLET BY MOUTH ONE TIME DAILY TABLET ORAL
Qty: 90 | Refills: 0 | Status: ACTIVE | COMMUNITY

## 2022-05-17 RX ORDER — ADALIMUMAB 40MG/0.4ML
AS DIRECTED START DAY 28 KIT SUBCUTANEOUS EVERY OTHER WEEK
Qty: 2 PRE-FILLED PEN SYRINGE | Refills: 6 | Status: ACTIVE | COMMUNITY
Start: 2022-05-06 | End: 2022-11-17

## 2022-05-17 NOTE — HPI-TODAY'S VISIT:
Lance returns today to discuss newly dx crohns. He had numerous questions about humira and is agreeable to starting. he does report the apriso has helped. reports no improvement on budesonide. He is feeling well today. still has RLQ pain. SB 2022 Flex sig L sided colitis, path favors crohns, anal fissure

## 2022-05-23 ENCOUNTER — WEB ENCOUNTER (OUTPATIENT)
Dept: URBAN - NONMETROPOLITAN AREA CLINIC 2 | Facility: CLINIC | Age: 37
End: 2022-05-23

## 2022-05-24 ENCOUNTER — OFFICE VISIT (OUTPATIENT)
Dept: URBAN - NONMETROPOLITAN AREA SURGERY CENTER 1 | Facility: SURGERY CENTER | Age: 37
End: 2022-05-24

## 2022-05-26 ENCOUNTER — OFFICE VISIT (OUTPATIENT)
Dept: URBAN - NONMETROPOLITAN AREA CLINIC 2 | Facility: CLINIC | Age: 37
End: 2022-05-26

## 2022-05-27 ENCOUNTER — WEB ENCOUNTER (OUTPATIENT)
Dept: URBAN - NONMETROPOLITAN AREA CLINIC 2 | Facility: CLINIC | Age: 37
End: 2022-05-27

## 2022-05-29 ENCOUNTER — WEB ENCOUNTER (OUTPATIENT)
Dept: URBAN - NONMETROPOLITAN AREA CLINIC 2 | Facility: CLINIC | Age: 37
End: 2022-05-29

## 2022-05-29 RX ORDER — PREDNISONE 20 MG/1
40MG X7 DAYS, 30MGX 7 DAYS, 20MG X 7 DAYS, AND 10MG X7 DAYS AND STOP TABLET ORAL ONCE A DAY
Qty: 35 TABLET | Refills: 0 | OUTPATIENT
Start: 2022-05-31 | End: 2022-06-28

## 2022-05-31 ENCOUNTER — WEB ENCOUNTER (OUTPATIENT)
Dept: URBAN - NONMETROPOLITAN AREA CLINIC 2 | Facility: CLINIC | Age: 37
End: 2022-05-31

## 2022-06-02 ENCOUNTER — WEB ENCOUNTER (OUTPATIENT)
Dept: URBAN - NONMETROPOLITAN AREA CLINIC 2 | Facility: CLINIC | Age: 37
End: 2022-06-02

## 2022-06-05 ENCOUNTER — WEB ENCOUNTER (OUTPATIENT)
Dept: URBAN - NONMETROPOLITAN AREA CLINIC 2 | Facility: CLINIC | Age: 37
End: 2022-06-05

## 2022-06-06 ENCOUNTER — WEB ENCOUNTER (OUTPATIENT)
Dept: URBAN - NONMETROPOLITAN AREA CLINIC 2 | Facility: CLINIC | Age: 37
End: 2022-06-06

## 2022-06-14 ENCOUNTER — WEB ENCOUNTER (OUTPATIENT)
Dept: URBAN - NONMETROPOLITAN AREA CLINIC 2 | Facility: CLINIC | Age: 37
End: 2022-06-14

## 2022-06-17 ENCOUNTER — WEB ENCOUNTER (OUTPATIENT)
Dept: URBAN - NONMETROPOLITAN AREA CLINIC 2 | Facility: CLINIC | Age: 37
End: 2022-06-17

## 2022-06-20 ENCOUNTER — WEB ENCOUNTER (OUTPATIENT)
Dept: URBAN - NONMETROPOLITAN AREA CLINIC 2 | Facility: CLINIC | Age: 37
End: 2022-06-20

## 2022-06-22 ENCOUNTER — WEB ENCOUNTER (OUTPATIENT)
Dept: URBAN - NONMETROPOLITAN AREA CLINIC 2 | Facility: CLINIC | Age: 37
End: 2022-06-22

## 2022-06-22 RX ORDER — MESALAMINE 375 MG/1
4 CAPSULES IN THE MORNING CAPSULE, EXTENDED RELEASE ORAL ONCE A DAY
Qty: 360 CAPSULE | Refills: 3

## 2022-06-23 ENCOUNTER — WEB ENCOUNTER (OUTPATIENT)
Dept: URBAN - NONMETROPOLITAN AREA CLINIC 2 | Facility: CLINIC | Age: 37
End: 2022-06-23

## 2022-06-23 RX ORDER — BALSALAZIDE DISODIUM 750 MG/1
3 CAPSULES CAPSULE ORAL
Qty: 270 | Refills: 6 | OUTPATIENT
Start: 2022-06-24 | End: 2023-01-19

## 2022-06-23 RX ORDER — MESALAMINE 375 MG/1
4 CAPSULES IN THE MORNING CAPSULE, EXTENDED RELEASE ORAL ONCE A DAY
Qty: 360 CAPSULE | Refills: 3
End: 2023-06-18

## 2022-06-27 ENCOUNTER — WEB ENCOUNTER (OUTPATIENT)
Dept: URBAN - NONMETROPOLITAN AREA CLINIC 2 | Facility: CLINIC | Age: 37
End: 2022-06-27

## 2022-06-28 ENCOUNTER — TELEPHONE ENCOUNTER (OUTPATIENT)
Dept: URBAN - METROPOLITAN AREA CLINIC 82 | Facility: CLINIC | Age: 37
End: 2022-06-28

## 2022-06-28 RX ORDER — PREDNISONE 20 MG/1
40MG X7 DAYS, 30MGX 7 DAYS, 20MG X 7 DAYS, AND 10MG X7 DAYS AND STOP TABLET ORAL ONCE A DAY
Qty: 35 TABLET | Refills: 0 | Status: ACTIVE | COMMUNITY
Start: 2022-05-31 | End: 2022-06-28

## 2022-06-28 RX ORDER — HYDROCODONE BITARTRATE AND ACETAMINOPHEN 7.5; 325 MG/1; MG/1
TAKE ONE TABLET BY MOUTH EVERY 4 HOURS AS NEEDED PAIN TABLET ORAL
Qty: 15 | Refills: 0 | Status: ACTIVE | COMMUNITY

## 2022-06-28 RX ORDER — LISINOPRIL 10 MG/1
TAKE ONE TABLET BY MOUTH ONE TIME DAILY TABLET ORAL
Qty: 90 | Refills: 0 | Status: ACTIVE | COMMUNITY

## 2022-06-28 RX ORDER — AMITRIPTYLINE HYDROCHLORIDE 10 MG/1
1 TABLET AT BEDTIME TABLET, FILM COATED ORAL ONCE A DAY
Qty: 30 | Status: ACTIVE | COMMUNITY

## 2022-06-28 RX ORDER — PROMETHAZINE HYDROCHLORIDE 25 MG/1
TAKE ONE TABLET BY MOUTH EVERY 6 HOURS AS NEEDED TABLET ORAL
Qty: 20 | Refills: 0 | Status: ACTIVE | COMMUNITY

## 2022-06-28 RX ORDER — BUDESONIDE 3 MG/1
3 CAPSULES CAPSULE, COATED PELLETS ORAL ONCE A DAY
Qty: 90 CAPSULE | Refills: 3 | Status: ACTIVE | COMMUNITY
Start: 2022-05-06

## 2022-06-28 RX ORDER — PREDNISONE 10 MG/1
4 TABLET TABLET ORAL ONCE A DAY
Qty: 28 TABLET | Refills: 0 | OUTPATIENT
Start: 2022-06-28 | End: 2022-07-05

## 2022-06-28 RX ORDER — CYCLOBENZAPRINE HYDROCHLORIDE 5 MG/1
TAKE ONCE DAILY PRN SIDE/BACK PAIN TABLET, FILM COATED ORAL ONCE A DAY
Qty: 30 | Refills: 1 | Status: ACTIVE | COMMUNITY

## 2022-06-28 RX ORDER — SUCRALFATE 1 G/1
TAKE ONE TABLET BY MOUTH FOUR TIMES A DAY WITH MEALS AND NIGHTLY FOR 14 DAYS TABLET ORAL
Qty: 56 | Refills: 0 | Status: ACTIVE | COMMUNITY

## 2022-06-28 RX ORDER — ADALIMUMAB 40MG/0.4ML
AS DIRECTED START DAY 28 KIT SUBCUTANEOUS EVERY OTHER WEEK
Qty: 2 PRE-FILLED PEN SYRINGE | Refills: 6 | Status: ACTIVE | COMMUNITY
Start: 2022-05-06 | End: 2022-11-17

## 2022-06-28 RX ORDER — BALSALAZIDE DISODIUM 750 MG/1
3 CAPSULES CAPSULE ORAL
Qty: 270 | Refills: 6 | Status: ACTIVE | COMMUNITY
Start: 2022-06-24 | End: 2023-01-19

## 2022-06-28 RX ORDER — IBUPROFEN 800 MG/1
TAKE ONE TABLET BY MOUTH EVERY 8 HOURS AS NEEDED TABLET, FILM COATED ORAL
Qty: 15 | Refills: 0 | Status: ACTIVE | COMMUNITY

## 2022-06-28 RX ORDER — ONDANSETRON 4 MG/1
PLACE ONE TABLET ON THE TONGUE THREE TIMES A DAY AS NEEDED FOR NAUSEA TABLET, ORALLY DISINTEGRATING ORAL
Qty: 30 | Refills: 0 | Status: ACTIVE | COMMUNITY

## 2022-06-28 RX ORDER — PANTOPRAZOLE SODIUM 40 MG/1
1 TABLET TABLET, DELAYED RELEASE ORAL TWICE DAILY
Qty: 60 TABLET | Refills: 6 | Status: ACTIVE | COMMUNITY

## 2022-06-28 RX ORDER — TIZANIDINE 4 MG/1
TAKE ONE TABLET BY MOUTH AT BEDTIME TABLET ORAL
Qty: 90 | Refills: 0 | Status: ACTIVE | COMMUNITY

## 2022-06-28 RX ORDER — MESALAMINE 375 MG/1
4 CAPSULES IN THE MORNING CAPSULE, EXTENDED RELEASE ORAL ONCE A DAY
Qty: 360 CAPSULE | Refills: 3 | Status: ACTIVE | COMMUNITY
End: 2023-06-18

## 2022-06-28 RX ORDER — AMLODIPINE BESYLATE 10 MG/1
TAKE ONE TABLET BY MOUTH ONE TIME DAILY TABLET ORAL
Qty: 90 | Refills: 0 | Status: ACTIVE | COMMUNITY

## 2022-07-04 ENCOUNTER — WEB ENCOUNTER (OUTPATIENT)
Dept: URBAN - NONMETROPOLITAN AREA CLINIC 2 | Facility: CLINIC | Age: 37
End: 2022-07-04

## 2022-07-05 ENCOUNTER — WEB ENCOUNTER (OUTPATIENT)
Dept: URBAN - NONMETROPOLITAN AREA CLINIC 2 | Facility: CLINIC | Age: 37
End: 2022-07-05

## 2022-07-05 ENCOUNTER — LAB OUTSIDE AN ENCOUNTER (OUTPATIENT)
Dept: URBAN - NONMETROPOLITAN AREA CLINIC 2 | Facility: CLINIC | Age: 37
End: 2022-07-05

## 2022-07-07 ENCOUNTER — WEB ENCOUNTER (OUTPATIENT)
Dept: URBAN - NONMETROPOLITAN AREA CLINIC 2 | Facility: CLINIC | Age: 37
End: 2022-07-07

## 2022-07-07 RX ORDER — CIPROFLOXACIN HYDROCHLORIDE 500 MG/1
1 TABLET TABLET, FILM COATED ORAL
Qty: 20 | OUTPATIENT
Start: 2022-07-07 | End: 2022-07-17

## 2022-07-08 ENCOUNTER — ERX REFILL RESPONSE (OUTPATIENT)
Dept: URBAN - NONMETROPOLITAN AREA CLINIC 2 | Facility: CLINIC | Age: 37
End: 2022-07-08

## 2022-07-08 RX ORDER — CIPROFLOXACIN 500 MG/1
TAKE ONE TABLET BY MOUTH EVERY 12 HOURS TABLET, FILM COATED ORAL
Qty: 28 TABLET | Refills: 0 | OUTPATIENT

## 2022-07-08 RX ORDER — METRONIDAZOLE 500 MG/1
TAKE ONE TABLET BY MOUTH THREE TIMES A DAY FOR 14 DAYS TABLET, FILM COATED ORAL
Qty: 42 TABLET | Refills: 0 | OUTPATIENT

## 2022-07-08 RX ORDER — CIPROFLOXACIN 500 MG/1
TAKE ONE TABLET BY MOUTH EVERY 12 HOURS TABLET, FILM COATED ORAL
Qty: 28 TABLET | Refills: 1 | OUTPATIENT

## 2022-07-08 RX ORDER — METRONIDAZOLE 500 MG/1
TAKE ONE TABLET BY MOUTH THREE TIMES A DAY FOR 14 DAYS TABLET, FILM COATED ORAL
Qty: 42 TABLET | Refills: 1 | OUTPATIENT

## 2022-07-11 ENCOUNTER — TELEPHONE ENCOUNTER (OUTPATIENT)
Dept: URBAN - METROPOLITAN AREA CLINIC 82 | Facility: CLINIC | Age: 37
End: 2022-07-11

## 2022-07-11 ENCOUNTER — WEB ENCOUNTER (OUTPATIENT)
Dept: URBAN - NONMETROPOLITAN AREA CLINIC 2 | Facility: CLINIC | Age: 37
End: 2022-07-11

## 2022-07-15 ENCOUNTER — OFFICE VISIT (OUTPATIENT)
Dept: URBAN - NONMETROPOLITAN AREA CLINIC 2 | Facility: CLINIC | Age: 37
End: 2022-07-15
Payer: COMMERCIAL

## 2022-07-15 VITALS
HEIGHT: 70 IN | SYSTOLIC BLOOD PRESSURE: 129 MMHG | BODY MASS INDEX: 32.93 KG/M2 | HEART RATE: 88 BPM | TEMPERATURE: 97.6 F | DIASTOLIC BLOOD PRESSURE: 84 MMHG | WEIGHT: 230 LBS

## 2022-07-15 DIAGNOSIS — R74.8 ELEVATED LIVER ENZYMES: ICD-10-CM

## 2022-07-15 DIAGNOSIS — K50.111 CROHN'S DISEASE OF COLON WITH RECTAL BLEEDING: ICD-10-CM

## 2022-07-15 PROCEDURE — 99214 OFFICE O/P EST MOD 30 MIN: CPT | Performed by: INTERNAL MEDICINE

## 2022-07-15 RX ORDER — PANTOPRAZOLE SODIUM 40 MG/1
1 TABLET TABLET, DELAYED RELEASE ORAL TWICE DAILY
Qty: 60 TABLET | Refills: 6 | Status: ACTIVE | COMMUNITY

## 2022-07-15 RX ORDER — TIZANIDINE 4 MG/1
TAKE ONE TABLET BY MOUTH AT BEDTIME TABLET ORAL
Qty: 90 | Refills: 0 | Status: ACTIVE | COMMUNITY

## 2022-07-15 RX ORDER — CYCLOBENZAPRINE HYDROCHLORIDE 5 MG/1
TAKE ONCE DAILY PRN SIDE/BACK PAIN TABLET, FILM COATED ORAL ONCE A DAY
Qty: 30 | Refills: 1 | Status: ACTIVE | COMMUNITY

## 2022-07-15 RX ORDER — AMITRIPTYLINE HYDROCHLORIDE 10 MG/1
1 TABLET AT BEDTIME TABLET, FILM COATED ORAL ONCE A DAY
Qty: 30 | Status: ACTIVE | COMMUNITY

## 2022-07-15 RX ORDER — SUCRALFATE 1 G/1
TAKE ONE TABLET BY MOUTH FOUR TIMES A DAY WITH MEALS AND NIGHTLY FOR 14 DAYS TABLET ORAL
Qty: 56 | Refills: 0 | Status: ACTIVE | COMMUNITY

## 2022-07-15 RX ORDER — AMLODIPINE BESYLATE 10 MG/1
TAKE ONE TABLET BY MOUTH ONE TIME DAILY TABLET ORAL
Qty: 90 | Refills: 0 | Status: DISCONTINUED | COMMUNITY

## 2022-07-15 RX ORDER — ADALIMUMAB 40MG/0.4ML
AS DIRECTED START DAY 28 KIT SUBCUTANEOUS EVERY OTHER WEEK
Qty: 2 PRE-FILLED PEN SYRINGE | Refills: 6 | Status: ACTIVE | COMMUNITY
Start: 2022-05-06 | End: 2022-11-17

## 2022-07-15 RX ORDER — MESALAMINE 375 MG/1
4 CAPSULES IN THE MORNING CAPSULE, EXTENDED RELEASE ORAL ONCE A DAY
Qty: 360 CAPSULE | Refills: 3 | Status: ACTIVE | COMMUNITY
End: 2023-06-18

## 2022-07-15 RX ORDER — HYDROCODONE BITARTRATE AND ACETAMINOPHEN 7.5; 325 MG/1; MG/1
TAKE ONE TABLET BY MOUTH EVERY 4 HOURS AS NEEDED PAIN TABLET ORAL
Qty: 15 | Refills: 0 | Status: ACTIVE | COMMUNITY

## 2022-07-15 RX ORDER — CIPROFLOXACIN 500 MG/1
TAKE ONE TABLET BY MOUTH EVERY 12 HOURS TABLET, FILM COATED ORAL
Qty: 28 TABLET | Refills: 0 | Status: ACTIVE | COMMUNITY

## 2022-07-15 RX ORDER — CIPROFLOXACIN HYDROCHLORIDE 500 MG/1
1 TABLET TABLET, FILM COATED ORAL
Qty: 20 | Status: ACTIVE | COMMUNITY
Start: 2022-07-07 | End: 2022-07-17

## 2022-07-15 RX ORDER — IBUPROFEN 800 MG/1
TAKE ONE TABLET BY MOUTH EVERY 8 HOURS AS NEEDED TABLET, FILM COATED ORAL
Qty: 15 | Refills: 0 | Status: ACTIVE | COMMUNITY

## 2022-07-15 RX ORDER — ONDANSETRON 4 MG/1
PLACE ONE TABLET ON THE TONGUE THREE TIMES A DAY AS NEEDED FOR NAUSEA TABLET, ORALLY DISINTEGRATING ORAL
Qty: 30 | Refills: 0 | Status: ACTIVE | COMMUNITY

## 2022-07-15 RX ORDER — BALSALAZIDE DISODIUM 750 MG/1
3 CAPSULES CAPSULE ORAL
Qty: 270 | Refills: 6 | Status: ACTIVE | COMMUNITY
Start: 2022-06-24 | End: 2023-01-19

## 2022-07-15 RX ORDER — BUDESONIDE 3 MG/1
3 CAPSULES CAPSULE, COATED PELLETS ORAL ONCE A DAY
Qty: 90 CAPSULE | Refills: 3 | Status: DISCONTINUED | COMMUNITY
Start: 2022-05-06

## 2022-07-15 RX ORDER — METRONIDAZOLE 500 MG/1
TAKE ONE TABLET BY MOUTH THREE TIMES A DAY FOR 14 DAYS TABLET, FILM COATED ORAL
Qty: 42 TABLET | Refills: 0 | Status: ACTIVE | COMMUNITY

## 2022-07-15 RX ORDER — HYDROCHLOROTHIAZIDE 25 MG/1
1 TABLET IN THE MORNING TABLET ORAL ONCE A DAY
Status: ACTIVE | COMMUNITY

## 2022-07-15 RX ORDER — PROMETHAZINE HYDROCHLORIDE 25 MG/1
TAKE ONE TABLET BY MOUTH EVERY 6 HOURS AS NEEDED TABLET ORAL
Qty: 20 | Refills: 0 | Status: ACTIVE | COMMUNITY

## 2022-07-15 RX ORDER — LISINOPRIL 10 MG/1
TAKE ONE TABLET BY MOUTH ONE TIME DAILY TABLET ORAL
Qty: 90 | Refills: 0 | Status: ACTIVE | COMMUNITY

## 2022-07-15 NOTE — HPI-TODAY'S VISIT:
7/15/22: Mr. Fletcher returns to GI clinic for follow-up of colonic Crohn's disease.  Since his last clinic visit, he continued Humira.  He visited [facility] ER for rectal bleeding and underwent labs and a CT scan.  Labs showed a WBC count of 13.5 and otherwise normal CBC and CMP.  CT scan was normal, no acute findings.  He was given anusol suppositories as well as cipro and discharged.  Today he reports that now that he is back on antibiotics he is doing well again.  No bleeding while he is on these but when he stops it will recur after about three weeks or so.  He is also back on prednisone.  His next Humira injection is tomorrow.    5/17/22: Lance returns today to discuss newly dx crohns. He had numerous questions about humira and is agreeable to starting. he does report the apriso has helped. reports no improvement on budesonide. He is feeling well today. still has RLQ pain. SB 2022 Flex sig L sided colitis, path favors crohns, anal fissure

## 2022-07-18 ENCOUNTER — WEB ENCOUNTER (OUTPATIENT)
Dept: URBAN - NONMETROPOLITAN AREA CLINIC 2 | Facility: CLINIC | Age: 37
End: 2022-07-18

## 2022-07-18 RX ORDER — METRONIDAZOLE 500 MG/1
TAKE ONE TABLET BY MOUTH THREE TIMES A DAY FOR 14 DAYS TABLET, FILM COATED ORAL
Qty: 42 TABLET | Refills: 0

## 2022-07-18 RX ORDER — CIPROFLOXACIN 500 MG/1
TAKE ONE TABLET BY MOUTH EVERY 12 HOURS TABLET, FILM COATED ORAL
Qty: 28 TABLET | Refills: 0

## 2022-07-26 ENCOUNTER — WEB ENCOUNTER (OUTPATIENT)
Dept: URBAN - NONMETROPOLITAN AREA CLINIC 2 | Facility: CLINIC | Age: 37
End: 2022-07-26

## 2022-07-27 ENCOUNTER — WEB ENCOUNTER (OUTPATIENT)
Dept: URBAN - NONMETROPOLITAN AREA CLINIC 2 | Facility: CLINIC | Age: 37
End: 2022-07-27

## 2022-08-03 ENCOUNTER — WEB ENCOUNTER (OUTPATIENT)
Dept: URBAN - NONMETROPOLITAN AREA CLINIC 2 | Facility: CLINIC | Age: 37
End: 2022-08-03

## 2022-08-07 LAB
ADALIMUMAB AB, IBD: <10
ADALIMUMAB AB, IBD: <10
ADALIMUMAB LEVEL, IBD: 9
COMMENT: (no result)
INTERPRETATION: (no result)

## 2022-08-08 ENCOUNTER — WEB ENCOUNTER (OUTPATIENT)
Dept: URBAN - METROPOLITAN AREA CLINIC 92 | Facility: CLINIC | Age: 37
End: 2022-08-08

## 2022-08-08 ENCOUNTER — WEB ENCOUNTER (OUTPATIENT)
Dept: URBAN - NONMETROPOLITAN AREA CLINIC 2 | Facility: CLINIC | Age: 37
End: 2022-08-08

## 2022-08-09 ENCOUNTER — WEB ENCOUNTER (OUTPATIENT)
Dept: URBAN - NONMETROPOLITAN AREA CLINIC 2 | Facility: CLINIC | Age: 37
End: 2022-08-09

## 2022-08-17 ENCOUNTER — WEB ENCOUNTER (OUTPATIENT)
Dept: URBAN - NONMETROPOLITAN AREA CLINIC 2 | Facility: CLINIC | Age: 37
End: 2022-08-17

## 2022-08-17 RX ORDER — CIPROFLOXACIN 500 MG/1
TAKE ONE TABLET BY MOUTH EVERY 12 HOURS TABLET, FILM COATED ORAL
Qty: 28 TABLET | Refills: 0

## 2022-08-17 RX ORDER — METRONIDAZOLE 500 MG/1
TAKE ONE TABLET BY MOUTH THREE TIMES A DAY FOR 14 DAYS TABLET, FILM COATED ORAL
Qty: 42 TABLET | Refills: 0

## 2022-08-30 ENCOUNTER — WEB ENCOUNTER (OUTPATIENT)
Dept: URBAN - NONMETROPOLITAN AREA CLINIC 2 | Facility: CLINIC | Age: 37
End: 2022-08-30

## 2022-08-30 RX ORDER — ONDANSETRON 4 MG/1
1 TABLET ON THE TONGUE AND ALLOW TO DISSOLVE TABLET, ORALLY DISINTEGRATING ORAL
Qty: 30 TABLET | Refills: 0 | OUTPATIENT
Start: 2022-08-31

## 2022-09-04 ENCOUNTER — WEB ENCOUNTER (OUTPATIENT)
Dept: URBAN - NONMETROPOLITAN AREA CLINIC 2 | Facility: CLINIC | Age: 37
End: 2022-09-04

## 2022-09-06 ENCOUNTER — WEB ENCOUNTER (OUTPATIENT)
Dept: URBAN - NONMETROPOLITAN AREA CLINIC 2 | Facility: CLINIC | Age: 37
End: 2022-09-06

## 2022-09-07 ENCOUNTER — OFFICE VISIT (OUTPATIENT)
Dept: URBAN - NONMETROPOLITAN AREA CLINIC 2 | Facility: CLINIC | Age: 37
End: 2022-09-07

## 2022-09-14 ENCOUNTER — TELEPHONE ENCOUNTER (OUTPATIENT)
Dept: URBAN - NONMETROPOLITAN AREA CLINIC 2 | Facility: CLINIC | Age: 37
End: 2022-09-14

## 2022-09-14 ENCOUNTER — OFFICE VISIT (OUTPATIENT)
Dept: URBAN - NONMETROPOLITAN AREA CLINIC 2 | Facility: CLINIC | Age: 37
End: 2022-09-14

## 2022-09-14 ENCOUNTER — OFFICE VISIT (OUTPATIENT)
Dept: URBAN - NONMETROPOLITAN AREA CLINIC 2 | Facility: CLINIC | Age: 37
End: 2022-09-14
Payer: COMMERCIAL

## 2022-09-14 VITALS
BODY MASS INDEX: 32.93 KG/M2 | HEART RATE: 80 BPM | DIASTOLIC BLOOD PRESSURE: 92 MMHG | WEIGHT: 230 LBS | SYSTOLIC BLOOD PRESSURE: 142 MMHG | HEIGHT: 70 IN | TEMPERATURE: 97.5 F

## 2022-09-14 DIAGNOSIS — R74.8 ELEVATED LIVER ENZYMES: ICD-10-CM

## 2022-09-14 DIAGNOSIS — K50.111 CROHN'S DISEASE OF COLON WITH RECTAL BLEEDING: ICD-10-CM

## 2022-09-14 PROCEDURE — 99214 OFFICE O/P EST MOD 30 MIN: CPT | Performed by: INTERNAL MEDICINE

## 2022-09-14 RX ORDER — PANTOPRAZOLE SODIUM 40 MG/1
1 TABLET TABLET, DELAYED RELEASE ORAL TWICE DAILY
Qty: 60 TABLET | Refills: 6 | Status: ACTIVE | COMMUNITY

## 2022-09-14 RX ORDER — LISINOPRIL 10 MG/1
TAKE ONE TABLET BY MOUTH ONE TIME DAILY TABLET ORAL
Qty: 90 | Refills: 0 | Status: ACTIVE | COMMUNITY

## 2022-09-14 RX ORDER — AMITRIPTYLINE HYDROCHLORIDE 10 MG/1
1 TABLET AT BEDTIME TABLET, FILM COATED ORAL ONCE A DAY
Qty: 30 | Status: ACTIVE | COMMUNITY

## 2022-09-14 RX ORDER — IBUPROFEN 800 MG/1
TAKE ONE TABLET BY MOUTH EVERY 8 HOURS AS NEEDED TABLET, FILM COATED ORAL
Qty: 15 | Refills: 0 | Status: ACTIVE | COMMUNITY

## 2022-09-14 RX ORDER — MESALAMINE 375 MG/1
4 CAPSULES IN THE MORNING CAPSULE, EXTENDED RELEASE ORAL ONCE A DAY
Qty: 360 CAPSULE | Refills: 3 | Status: ACTIVE | COMMUNITY
End: 2023-06-18

## 2022-09-14 RX ORDER — TIZANIDINE 4 MG/1
TAKE ONE TABLET BY MOUTH AT BEDTIME TABLET ORAL
Qty: 90 | Refills: 0 | Status: ACTIVE | COMMUNITY

## 2022-09-14 RX ORDER — CYCLOBENZAPRINE HYDROCHLORIDE 5 MG/1
TAKE ONCE DAILY PRN SIDE/BACK PAIN TABLET, FILM COATED ORAL ONCE A DAY
Qty: 30 | Refills: 1 | Status: ACTIVE | COMMUNITY

## 2022-09-14 RX ORDER — BALSALAZIDE DISODIUM 750 MG/1
3 CAPSULES CAPSULE ORAL
Qty: 270 | Refills: 6 | Status: ACTIVE | COMMUNITY
Start: 2022-06-24 | End: 2023-01-19

## 2022-09-14 RX ORDER — METRONIDAZOLE 500 MG/1
TAKE ONE TABLET BY MOUTH THREE TIMES A DAY FOR 14 DAYS TABLET, FILM COATED ORAL
Qty: 42 TABLET | Refills: 0 | Status: DISCONTINUED | COMMUNITY

## 2022-09-14 RX ORDER — HYDROCHLOROTHIAZIDE 25 MG/1
1 TABLET IN THE MORNING TABLET ORAL ONCE A DAY
Status: ACTIVE | COMMUNITY

## 2022-09-14 RX ORDER — CIPROFLOXACIN 500 MG/1
TAKE ONE TABLET BY MOUTH EVERY 12 HOURS TABLET, FILM COATED ORAL
Qty: 28 TABLET | Refills: 0 | Status: DISCONTINUED | COMMUNITY

## 2022-09-14 RX ORDER — ADALIMUMAB 40MG/0.4ML
AS DIRECTED START DAY 28 KIT SUBCUTANEOUS EVERY OTHER WEEK
Qty: 2 PRE-FILLED PEN SYRINGE | Refills: 6 | Status: ACTIVE | COMMUNITY
Start: 2022-05-06 | End: 2022-11-17

## 2022-09-14 RX ORDER — ONDANSETRON 4 MG/1
1 TABLET ON THE TONGUE AND ALLOW TO DISSOLVE TABLET, ORALLY DISINTEGRATING ORAL
Qty: 30 TABLET | Refills: 0 | Status: ACTIVE | COMMUNITY
Start: 2022-08-31

## 2022-09-14 RX ORDER — HYDROCODONE BITARTRATE AND ACETAMINOPHEN 7.5; 325 MG/1; MG/1
TAKE ONE TABLET BY MOUTH EVERY 4 HOURS AS NEEDED PAIN TABLET ORAL
Qty: 15 | Refills: 0 | Status: ACTIVE | COMMUNITY

## 2022-09-14 NOTE — HPI-TODAY'S VISIT:
7/15/22: Mr. Fletcher returns to GI clinic for follow-up of colonic Crohn's disease.  Since his last clinic visit, he continued Humira.  He visited [facility] ER for rectal bleeding and underwent labs and a CT scan.  Labs showed a WBC count of 13.5 and otherwise normal CBC and CMP.  CT scan was normal, no acute findings.  He was given anusol suppositories as well as cipro and discharged.  Today he reports that now that he is back on antibiotics he is doing well again.  No bleeding while he is on these but when he stops it will recur after about three weeks or so.  He is also back on prednisone.  His next Humira injection is tomorrow.    5/17/22: Lance returns today to discuss newly dx crohns. He had numerous questions about humira and is agreeable to starting. he does report the apriso has helped. reports no improvement on budesonide. He is feeling well today. still has RLQ pain. SB 2022 Flex sig L sided colitis, path favors crohns, anal fissure  9/14/22: Mr. Fletcher returns for follow-up of colonic Crohn's disease.  Since his last clinic visit, he took antibiotics, felt well, and then when he stopped them he had an uptick in diarrhea and rectal bleeding.  No rectal pain.

## 2022-09-14 NOTE — PHYSICAL EXAM NECK/THYROID:
normal appearance , without tenderness upon palpation , no deformities , trachea midline , Thyroid normal size , no thyroid nodules , no masses , no JVD , thyroid nontender
Patient baseline mental status

## 2022-09-21 ENCOUNTER — WEB ENCOUNTER (OUTPATIENT)
Dept: URBAN - NONMETROPOLITAN AREA CLINIC 2 | Facility: CLINIC | Age: 37
End: 2022-09-21

## 2022-09-21 RX ORDER — METRONIDAZOLE 500 MG/1
1 TABLET TABLET, FILM COATED ORAL THREE TIMES A DAY
Qty: 42 TABLET | Refills: 0 | OUTPATIENT
Start: 2022-09-21 | End: 2022-10-05

## 2022-09-21 RX ORDER — CIPROFLOXACIN HYDROCHLORIDE 500 MG/1
1 TABLET TABLET, FILM COATED ORAL
Qty: 28 TABLET | Refills: 0 | OUTPATIENT
Start: 2022-09-21 | End: 2022-10-05

## 2022-09-29 ENCOUNTER — CLAIMS CREATED FROM THE CLAIM WINDOW (OUTPATIENT)
Dept: URBAN - METROPOLITAN AREA CLINIC 4 | Facility: CLINIC | Age: 37
End: 2022-09-29
Payer: COMMERCIAL

## 2022-09-29 ENCOUNTER — OFFICE VISIT (OUTPATIENT)
Dept: URBAN - NONMETROPOLITAN AREA SURGERY CENTER 1 | Facility: SURGERY CENTER | Age: 37
End: 2022-09-29
Payer: COMMERCIAL

## 2022-09-29 DIAGNOSIS — K52.3 INDETERMINATE COLITIS: ICD-10-CM

## 2022-09-29 DIAGNOSIS — K50.811 CROHN'S DISEASE OF BOTH SMALL AND LARGE INTESTINE WITH RECTAL BLEEDING: ICD-10-CM

## 2022-09-29 DIAGNOSIS — K52.3 COLITIS, INDETERMINATE: ICD-10-CM

## 2022-09-29 PROCEDURE — 88305 TISSUE EXAM BY PATHOLOGIST: CPT | Performed by: PATHOLOGY

## 2022-09-29 PROCEDURE — 45380 COLONOSCOPY AND BIOPSY: CPT | Performed by: INTERNAL MEDICINE

## 2022-09-29 PROCEDURE — G8907 PT DOC NO EVENTS ON DISCHARG: HCPCS | Performed by: INTERNAL MEDICINE

## 2022-10-06 ENCOUNTER — OFFICE VISIT (OUTPATIENT)
Dept: URBAN - NONMETROPOLITAN AREA CLINIC 2 | Facility: CLINIC | Age: 37
End: 2022-10-06

## 2022-10-11 ENCOUNTER — WEB ENCOUNTER (OUTPATIENT)
Dept: URBAN - NONMETROPOLITAN AREA CLINIC 2 | Facility: CLINIC | Age: 37
End: 2022-10-11

## 2022-10-17 ENCOUNTER — TELEPHONE ENCOUNTER (OUTPATIENT)
Dept: URBAN - METROPOLITAN AREA CLINIC 92 | Facility: CLINIC | Age: 37
End: 2022-10-17

## 2022-10-17 ENCOUNTER — WEB ENCOUNTER (OUTPATIENT)
Dept: URBAN - NONMETROPOLITAN AREA CLINIC 2 | Facility: CLINIC | Age: 37
End: 2022-10-17

## 2022-10-21 ENCOUNTER — WEB ENCOUNTER (OUTPATIENT)
Dept: URBAN - NONMETROPOLITAN AREA CLINIC 2 | Facility: CLINIC | Age: 37
End: 2022-10-21

## 2022-10-21 RX ORDER — ADALIMUMAB 40MG/0.4ML
1 SUBQ Q  WEEK KIT SUBCUTANEOUS
Qty: 12 | Refills: 0 | OUTPATIENT
Start: 2022-10-21 | End: 2023-01-18

## 2022-10-27 ENCOUNTER — WEB ENCOUNTER (OUTPATIENT)
Dept: URBAN - NONMETROPOLITAN AREA CLINIC 2 | Facility: CLINIC | Age: 37
End: 2022-10-27

## 2022-10-27 RX ORDER — METRONIDAZOLE 500 MG/1
1 TABLET TABLET, FILM COATED ORAL
Qty: 42 TABLET | Refills: 0 | OUTPATIENT

## 2022-10-27 RX ORDER — CIPROFLOXACIN HYDROCHLORIDE 500 MG/1
1 TABLET TABLET, FILM COATED ORAL
Qty: 28 TABLET | Refills: 0 | OUTPATIENT
Start: 2022-10-27 | End: 2022-11-09

## 2022-10-29 ENCOUNTER — WEB ENCOUNTER (OUTPATIENT)
Dept: URBAN - NONMETROPOLITAN AREA CLINIC 2 | Facility: CLINIC | Age: 37
End: 2022-10-29

## 2022-10-31 ENCOUNTER — WEB ENCOUNTER (OUTPATIENT)
Dept: URBAN - NONMETROPOLITAN AREA CLINIC 2 | Facility: CLINIC | Age: 37
End: 2022-10-31

## 2022-11-01 ENCOUNTER — WEB ENCOUNTER (OUTPATIENT)
Dept: URBAN - NONMETROPOLITAN AREA CLINIC 2 | Facility: CLINIC | Age: 37
End: 2022-11-01

## 2022-11-01 RX ORDER — ADALIMUMAB 40MG/0.4ML
1 SUBQ Q  WEEK KIT SUBCUTANEOUS
Qty: 12 | Refills: 3

## 2022-11-05 ENCOUNTER — WEB ENCOUNTER (OUTPATIENT)
Dept: URBAN - NONMETROPOLITAN AREA CLINIC 2 | Facility: CLINIC | Age: 37
End: 2022-11-05

## 2022-11-15 ENCOUNTER — WEB ENCOUNTER (OUTPATIENT)
Dept: URBAN - NONMETROPOLITAN AREA CLINIC 2 | Facility: CLINIC | Age: 37
End: 2022-11-15

## 2022-11-29 ENCOUNTER — WEB ENCOUNTER (OUTPATIENT)
Dept: URBAN - NONMETROPOLITAN AREA CLINIC 2 | Facility: CLINIC | Age: 37
End: 2022-11-29

## 2022-11-30 ENCOUNTER — WEB ENCOUNTER (OUTPATIENT)
Dept: URBAN - NONMETROPOLITAN AREA CLINIC 2 | Facility: CLINIC | Age: 37
End: 2022-11-30

## 2022-12-06 ENCOUNTER — WEB ENCOUNTER (OUTPATIENT)
Dept: URBAN - NONMETROPOLITAN AREA CLINIC 2 | Facility: CLINIC | Age: 37
End: 2022-12-06

## 2022-12-07 ENCOUNTER — WEB ENCOUNTER (OUTPATIENT)
Dept: URBAN - NONMETROPOLITAN AREA CLINIC 2 | Facility: CLINIC | Age: 37
End: 2022-12-07

## 2022-12-07 RX ORDER — CIPROFLOXACIN HYDROCHLORIDE 500 MG/1
1 TABLET TABLET, FILM COATED ORAL
Qty: 28 TABLET | Refills: 0 | OUTPATIENT
Start: 2022-12-11 | End: 2022-12-25

## 2022-12-07 RX ORDER — METRONIDAZOLE 500 MG/1
1 TABLET TABLET, FILM COATED ORAL THREE TIMES A DAY
Qty: 42 TABLET | Refills: 0 | OUTPATIENT
Start: 2022-12-11 | End: 2022-12-25

## 2022-12-14 ENCOUNTER — WEB ENCOUNTER (OUTPATIENT)
Dept: URBAN - NONMETROPOLITAN AREA CLINIC 2 | Facility: CLINIC | Age: 37
End: 2022-12-14

## 2022-12-27 ENCOUNTER — WEB ENCOUNTER (OUTPATIENT)
Dept: URBAN - NONMETROPOLITAN AREA CLINIC 2 | Facility: CLINIC | Age: 37
End: 2022-12-27

## 2023-01-03 ENCOUNTER — WEB ENCOUNTER (OUTPATIENT)
Dept: URBAN - NONMETROPOLITAN AREA CLINIC 2 | Facility: CLINIC | Age: 38
End: 2023-01-03

## 2023-01-10 ENCOUNTER — TELEPHONE ENCOUNTER (OUTPATIENT)
Dept: URBAN - NONMETROPOLITAN AREA CLINIC 2 | Facility: CLINIC | Age: 38
End: 2023-01-10

## 2023-01-10 ENCOUNTER — OFFICE VISIT (OUTPATIENT)
Dept: URBAN - METROPOLITAN AREA TELEHEALTH 2 | Facility: TELEHEALTH | Age: 38
End: 2023-01-10
Payer: COMMERCIAL

## 2023-01-10 DIAGNOSIS — D84.9 IMMUNOSUPPRESSED STATUS: ICD-10-CM

## 2023-01-10 DIAGNOSIS — K50.111 CROHN'S DISEASE OF COLON WITH RECTAL BLEEDING: ICD-10-CM

## 2023-01-10 PROCEDURE — 99213 OFFICE O/P EST LOW 20 MIN: CPT | Performed by: INTERNAL MEDICINE

## 2023-01-10 RX ORDER — HYDROCHLOROTHIAZIDE 25 MG/1
1 TABLET IN THE MORNING TABLET ORAL ONCE A DAY
Status: ACTIVE | COMMUNITY

## 2023-01-10 RX ORDER — ONDANSETRON 4 MG/1
1 TABLET ON THE TONGUE AND ALLOW TO DISSOLVE TABLET, ORALLY DISINTEGRATING ORAL
Qty: 30 TABLET | Refills: 0 | Status: ACTIVE | COMMUNITY
Start: 2022-08-31

## 2023-01-10 RX ORDER — PANTOPRAZOLE SODIUM 40 MG/1
1 TABLET TABLET, DELAYED RELEASE ORAL TWICE DAILY
Qty: 60 TABLET | Refills: 6 | Status: ACTIVE | COMMUNITY

## 2023-01-10 RX ORDER — BALSALAZIDE DISODIUM 750 MG/1
3 CAPSULES CAPSULE ORAL
Qty: 270 | Refills: 6 | Status: ACTIVE | COMMUNITY
Start: 2022-06-24 | End: 2023-01-19

## 2023-01-10 RX ORDER — MESALAMINE 375 MG/1
4 CAPSULES IN THE MORNING CAPSULE, EXTENDED RELEASE ORAL ONCE A DAY
Qty: 360 CAPSULE | Refills: 3 | Status: ACTIVE | COMMUNITY
End: 2023-06-18

## 2023-01-10 RX ORDER — DICYCLOMINE HYDROCHLORIDE 10 MG/1
1 CAPSULES CAPSULE ORAL THREE TIMES A DAY
Qty: 90 CAPSULE | Refills: 3 | OUTPATIENT
Start: 2023-01-10 | End: 2023-05-10

## 2023-01-10 RX ORDER — AMITRIPTYLINE HYDROCHLORIDE 10 MG/1
1 TABLET AT BEDTIME TABLET, FILM COATED ORAL ONCE A DAY
Qty: 30 | Status: ACTIVE | COMMUNITY

## 2023-01-10 RX ORDER — METRONIDAZOLE 500 MG/1
1 TABLET TABLET, FILM COATED ORAL
Qty: 42 TABLET | Refills: 0 | Status: ACTIVE | COMMUNITY

## 2023-01-10 RX ORDER — LISINOPRIL 10 MG/1
TAKE ONE TABLET BY MOUTH ONE TIME DAILY TABLET ORAL
Qty: 90 | Refills: 0 | Status: ACTIVE | COMMUNITY

## 2023-01-10 RX ORDER — TIZANIDINE 4 MG/1
TAKE ONE TABLET BY MOUTH AT BEDTIME TABLET ORAL
Qty: 90 | Refills: 0 | Status: ACTIVE | COMMUNITY

## 2023-01-10 RX ORDER — CYCLOBENZAPRINE HYDROCHLORIDE 5 MG/1
TAKE ONCE DAILY PRN SIDE/BACK PAIN TABLET, FILM COATED ORAL ONCE A DAY
Qty: 30 | Refills: 1 | Status: ACTIVE | COMMUNITY

## 2023-01-10 RX ORDER — ADALIMUMAB 40MG/0.4ML
1 SUBQ Q  WEEK KIT SUBCUTANEOUS
Qty: 12 | Refills: 3 | Status: ACTIVE | COMMUNITY

## 2023-01-10 RX ORDER — IBUPROFEN 800 MG/1
TAKE ONE TABLET BY MOUTH EVERY 8 HOURS AS NEEDED TABLET, FILM COATED ORAL
Qty: 15 | Refills: 0 | Status: ACTIVE | COMMUNITY

## 2023-01-10 RX ORDER — HYDROCODONE BITARTRATE AND ACETAMINOPHEN 7.5; 325 MG/1; MG/1
TAKE ONE TABLET BY MOUTH EVERY 4 HOURS AS NEEDED PAIN TABLET ORAL
Qty: 15 | Refills: 0 | Status: ACTIVE | COMMUNITY

## 2023-01-10 NOTE — HPI-TODAY'S VISIT:
Lance returns for follow-up of Crohn's disease.  He has had a colonoscopy with some mild erythema in his rectosigmoid followed by a flex sig and colonoscopy with patchy colitis in this area.  Path was indeterminant but consistent with Crohn's.  Oddly, his symptoms tend to improve with antibiotics.  Today, he is currently on weekly Humira.  He states his symptoms are about 70% improved from previous only.  He is still occasionally seeing some blood with BM, but BMs are basically formed.  He does still have some abdominal pain at times it seems to move from left to right side of his abdomen. Sb

## 2023-01-11 PROBLEM — 38013005: Status: ACTIVE | Noted: 2022-05-03

## 2023-01-11 PROBLEM — 50440006: Status: ACTIVE | Noted: 2022-05-03

## 2023-01-24 ENCOUNTER — WEB ENCOUNTER (OUTPATIENT)
Dept: URBAN - NONMETROPOLITAN AREA CLINIC 2 | Facility: CLINIC | Age: 38
End: 2023-01-24

## 2023-01-25 ENCOUNTER — WEB ENCOUNTER (OUTPATIENT)
Dept: URBAN - NONMETROPOLITAN AREA CLINIC 2 | Facility: CLINIC | Age: 38
End: 2023-01-25

## 2023-02-01 ENCOUNTER — WEB ENCOUNTER (OUTPATIENT)
Dept: URBAN - NONMETROPOLITAN AREA CLINIC 2 | Facility: CLINIC | Age: 38
End: 2023-02-01

## 2023-02-07 ENCOUNTER — WEB ENCOUNTER (OUTPATIENT)
Dept: URBAN - NONMETROPOLITAN AREA CLINIC 2 | Facility: CLINIC | Age: 38
End: 2023-02-07

## 2023-02-08 ENCOUNTER — WEB ENCOUNTER (OUTPATIENT)
Dept: URBAN - NONMETROPOLITAN AREA CLINIC 2 | Facility: CLINIC | Age: 38
End: 2023-02-08

## 2023-02-12 LAB
A/G RATIO: 2.1
ABSOLUTE BASOPHILS: 79
ABSOLUTE EOSINOPHILS: 502
ABSOLUTE LYMPHOCYTES: 3291
ABSOLUTE MONOCYTES: 572
ABSOLUTE NEUTROPHILS: 4356
ALBUMIN: 4.8
ALKALINE PHOSPHATASE: 62
ALT (SGPT): 40
AST (SGOT): 22
BASOPHILS: 0.9
BILIRUBIN, TOTAL: 0.5
BUN/CREATININE RATIO: (no result)
BUN: 16
C-REACTIVE PROTEIN, QUANT: 2.1
CALCIUM: 10.1
CARBON DIOXIDE, TOTAL: 26
CHLORIDE: 103
CREATININE: 0.98
EGFR: 102
EOSINOPHILS: 5.7
GLOBULIN, TOTAL: 2.3
GLUCOSE: 92
HEMATOCRIT: 48.6
HEMOGLOBIN: 17
LYMPHOCYTES: 37.4
MCH: 30.9
MCHC: 35
MCV: 88.4
MITOGEN-NIL: >10
MONOCYTES: 6.5
MPV: 9.5
NEUTROPHILS: 49.5
PLATELET COUNT: 345
POTASSIUM: 3.9
PROTEIN, TOTAL: 7.1
QUANTIFERON NIL VALUE: 0.06
QUANTIFERON TB1 AG VALUE: <0
QUANTIFERON TB2 AG VALUE: <0
QUANTIFERON-TB GOLD PLUS: NEGATIVE
RDW: 12.7
RED BLOOD CELL COUNT: 5.5
SED RATE BY MODIFIED: 2
SODIUM: 140
WHITE BLOOD CELL COUNT: 8.8

## 2023-02-13 ENCOUNTER — WEB ENCOUNTER (OUTPATIENT)
Dept: URBAN - METROPOLITAN AREA CLINIC 92 | Facility: CLINIC | Age: 38
End: 2023-02-13

## 2023-02-14 ENCOUNTER — WEB ENCOUNTER (OUTPATIENT)
Dept: URBAN - NONMETROPOLITAN AREA CLINIC 2 | Facility: CLINIC | Age: 38
End: 2023-02-14

## 2023-02-20 ENCOUNTER — WEB ENCOUNTER (OUTPATIENT)
Dept: URBAN - NONMETROPOLITAN AREA CLINIC 2 | Facility: CLINIC | Age: 38
End: 2023-02-20

## 2023-02-20 RX ORDER — METRONIDAZOLE 500 MG/1
1 TABLET TABLET, FILM COATED ORAL THREE TIMES A DAY
Qty: 42 TABLET | Refills: 0 | OUTPATIENT
Start: 2023-02-21 | End: 2023-03-07

## 2023-02-20 RX ORDER — PREDNISONE 20 MG/1
40MG X7 DAYS, 30MGX 7 DAYS, 20MG X 7 DAYS, AND 10MG X7 DAYS AND STOP TABLET ORAL ONCE A DAY
Qty: 35 TABLET | Refills: 0 | OUTPATIENT
Start: 2023-02-21 | End: 2023-03-21

## 2023-02-27 ENCOUNTER — WEB ENCOUNTER (OUTPATIENT)
Dept: URBAN - NONMETROPOLITAN AREA CLINIC 2 | Facility: CLINIC | Age: 38
End: 2023-02-27

## 2023-03-06 ENCOUNTER — WEB ENCOUNTER (OUTPATIENT)
Dept: URBAN - NONMETROPOLITAN AREA CLINIC 2 | Facility: CLINIC | Age: 38
End: 2023-03-06

## 2023-03-07 ENCOUNTER — WEB ENCOUNTER (OUTPATIENT)
Dept: URBAN - NONMETROPOLITAN AREA CLINIC 2 | Facility: CLINIC | Age: 38
End: 2023-03-07

## 2023-03-10 ENCOUNTER — WEB ENCOUNTER (OUTPATIENT)
Dept: URBAN - NONMETROPOLITAN AREA CLINIC 2 | Facility: CLINIC | Age: 38
End: 2023-03-10

## 2023-03-13 ENCOUNTER — WEB ENCOUNTER (OUTPATIENT)
Dept: URBAN - NONMETROPOLITAN AREA CLINIC 2 | Facility: CLINIC | Age: 38
End: 2023-03-13

## 2023-03-14 ENCOUNTER — WEB ENCOUNTER (OUTPATIENT)
Dept: URBAN - NONMETROPOLITAN AREA CLINIC 2 | Facility: CLINIC | Age: 38
End: 2023-03-14

## 2023-03-23 ENCOUNTER — WEB ENCOUNTER (OUTPATIENT)
Dept: URBAN - NONMETROPOLITAN AREA CLINIC 2 | Facility: CLINIC | Age: 38
End: 2023-03-23

## 2023-03-29 ENCOUNTER — TELEPHONE ENCOUNTER (OUTPATIENT)
Dept: URBAN - METROPOLITAN AREA CLINIC 35 | Facility: CLINIC | Age: 38
End: 2023-03-29

## 2023-03-29 ENCOUNTER — OFFICE VISIT (OUTPATIENT)
Dept: URBAN - NONMETROPOLITAN AREA CLINIC 2 | Facility: CLINIC | Age: 38
End: 2023-03-29
Payer: COMMERCIAL

## 2023-03-29 VITALS
HEIGHT: 70 IN | HEART RATE: 68 BPM | BODY MASS INDEX: 37.94 KG/M2 | DIASTOLIC BLOOD PRESSURE: 86 MMHG | WEIGHT: 265 LBS | TEMPERATURE: 98.8 F | SYSTOLIC BLOOD PRESSURE: 126 MMHG

## 2023-03-29 DIAGNOSIS — R74.8 ELEVATED LIVER ENZYMES: ICD-10-CM

## 2023-03-29 DIAGNOSIS — D84.9 IMMUNOSUPPRESSED STATUS: ICD-10-CM

## 2023-03-29 DIAGNOSIS — K50.111 CROHN'S DISEASE OF COLON WITH RECTAL BLEEDING: ICD-10-CM

## 2023-03-29 PROCEDURE — 99214 OFFICE O/P EST MOD 30 MIN: CPT | Performed by: INTERNAL MEDICINE

## 2023-03-29 RX ORDER — METRONIDAZOLE 500 MG/1
1 TABLET TABLET, FILM COATED ORAL
Qty: 42 TABLET | Refills: 0 | Status: ACTIVE | COMMUNITY

## 2023-03-29 RX ORDER — TIZANIDINE 4 MG/1
TAKE ONE TABLET BY MOUTH AT BEDTIME TABLET ORAL
Qty: 90 | Refills: 0 | Status: ACTIVE | COMMUNITY

## 2023-03-29 RX ORDER — IBUPROFEN 800 MG/1
TAKE ONE TABLET BY MOUTH EVERY 8 HOURS AS NEEDED TABLET, FILM COATED ORAL
Qty: 15 | Refills: 0 | Status: ACTIVE | COMMUNITY

## 2023-03-29 RX ORDER — DICYCLOMINE HYDROCHLORIDE 10 MG/1
1 CAPSULES CAPSULE ORAL THREE TIMES A DAY
Qty: 90 CAPSULE | Refills: 3 | Status: ACTIVE | COMMUNITY
Start: 2023-01-10 | End: 2023-05-10

## 2023-03-29 RX ORDER — CYCLOBENZAPRINE HYDROCHLORIDE 5 MG/1
TAKE ONCE DAILY PRN SIDE/BACK PAIN TABLET, FILM COATED ORAL ONCE A DAY
Qty: 30 | Refills: 1 | Status: ACTIVE | COMMUNITY

## 2023-03-29 RX ORDER — HYDROCHLOROTHIAZIDE 25 MG/1
1 TABLET IN THE MORNING TABLET ORAL ONCE A DAY
Status: ACTIVE | COMMUNITY

## 2023-03-29 RX ORDER — PANTOPRAZOLE SODIUM 40 MG/1
1 TABLET TABLET, DELAYED RELEASE ORAL TWICE DAILY
Qty: 60 TABLET | Refills: 6 | Status: ACTIVE | COMMUNITY

## 2023-03-29 RX ORDER — ADALIMUMAB 40MG/0.4ML
1 SUBQ Q  WEEK KIT SUBCUTANEOUS
Qty: 12 | Refills: 3 | Status: ACTIVE | COMMUNITY

## 2023-03-29 RX ORDER — HYDROCODONE BITARTRATE AND ACETAMINOPHEN 7.5; 325 MG/1; MG/1
TAKE ONE TABLET BY MOUTH EVERY 4 HOURS AS NEEDED PAIN TABLET ORAL
Qty: 15 | Refills: 0 | Status: ACTIVE | COMMUNITY

## 2023-03-29 RX ORDER — AMITRIPTYLINE HYDROCHLORIDE 10 MG/1
1 TABLET AT BEDTIME TABLET, FILM COATED ORAL ONCE A DAY
Qty: 30 | Status: ACTIVE | COMMUNITY

## 2023-03-29 RX ORDER — VEDOLIZUMAB 300 MG/5ML
0,2,6 AND 8 WEEKS INJECTION, POWDER, LYOPHILIZED, FOR SOLUTION INTRAVENOUS
OUTPATIENT
Start: 2023-03-29

## 2023-03-29 RX ORDER — ONDANSETRON 4 MG/1
1 TABLET ON THE TONGUE AND ALLOW TO DISSOLVE TABLET, ORALLY DISINTEGRATING ORAL
Qty: 30 TABLET | Refills: 0 | Status: ACTIVE | COMMUNITY
Start: 2022-08-31

## 2023-03-29 RX ORDER — LISINOPRIL 10 MG/1
TAKE ONE TABLET BY MOUTH ONE TIME DAILY TABLET ORAL
Qty: 90 | Refills: 0 | Status: ACTIVE | COMMUNITY

## 2023-03-29 RX ORDER — MESALAMINE 375 MG/1
4 CAPSULES IN THE MORNING CAPSULE, EXTENDED RELEASE ORAL ONCE A DAY
Qty: 360 CAPSULE | Refills: 3 | Status: ACTIVE | COMMUNITY
End: 2023-06-18

## 2023-03-29 NOTE — PHYSICAL EXAM NECK/THYROID:
normal appearance , without tenderness upon palpation , no deformities , trachea midline , Thyroid normal size , no masses , thyroid nontender
no

## 2023-03-29 NOTE — HPI-TODAY'S VISIT:
Lance returns for follow-up of Crohn's disease.  He has had a colonoscopy with some mild erythema in his rectosigmoid followed by a flex sig and colonoscopy with patchy colitis in this area.  Path was indeterminant but consistent with Crohn's.  Symptoms tend to improve with antibiotics.  Today, he is currently on weekly Humira.  He states his symptoms are about 70% improved from previous only.  He is still occasionally seeing some blood with BM, approximately 4-5x per day and loose.  He had diarrhea with incontinence at work last week which was embarrasing.  He does still have some abdominal pain at times it seems to move from left to right side of his abdomen.

## 2023-04-04 ENCOUNTER — WEB ENCOUNTER (OUTPATIENT)
Dept: URBAN - NONMETROPOLITAN AREA CLINIC 2 | Facility: CLINIC | Age: 38
End: 2023-04-04

## 2023-04-11 ENCOUNTER — WEB ENCOUNTER (OUTPATIENT)
Dept: URBAN - NONMETROPOLITAN AREA CLINIC 2 | Facility: CLINIC | Age: 38
End: 2023-04-11

## 2023-04-13 ENCOUNTER — WEB ENCOUNTER (OUTPATIENT)
Dept: URBAN - NONMETROPOLITAN AREA CLINIC 2 | Facility: CLINIC | Age: 38
End: 2023-04-13

## 2023-04-17 ENCOUNTER — WEB ENCOUNTER (OUTPATIENT)
Dept: URBAN - NONMETROPOLITAN AREA CLINIC 13 | Facility: CLINIC | Age: 38
End: 2023-04-17

## 2023-04-17 ENCOUNTER — ERX REFILL RESPONSE (OUTPATIENT)
Dept: URBAN - NONMETROPOLITAN AREA CLINIC 2 | Facility: CLINIC | Age: 38
End: 2023-04-17

## 2023-04-17 RX ORDER — PANTOPRAZOLE SODIUM 40 MG/1
TAKE ONE TABLET BY MOUTH TWICE A DAY TABLET, DELAYED RELEASE ORAL
Qty: 60 TABLET | Refills: 6 | OUTPATIENT

## 2023-04-17 RX ORDER — PANTOPRAZOLE SODIUM 40 MG/1
1 TABLET TABLET, DELAYED RELEASE ORAL TWICE DAILY
Qty: 60 TABLET | Refills: 6 | OUTPATIENT

## 2023-04-19 ENCOUNTER — WEB ENCOUNTER (OUTPATIENT)
Dept: URBAN - NONMETROPOLITAN AREA CLINIC 2 | Facility: CLINIC | Age: 38
End: 2023-04-19

## 2023-04-27 ENCOUNTER — WEB ENCOUNTER (OUTPATIENT)
Dept: URBAN - NONMETROPOLITAN AREA CLINIC 2 | Facility: CLINIC | Age: 38
End: 2023-04-27

## 2023-04-28 ENCOUNTER — OFFICE VISIT (OUTPATIENT)
Dept: URBAN - METROPOLITAN AREA CLINIC 97 | Facility: CLINIC | Age: 38
End: 2023-04-28
Payer: COMMERCIAL

## 2023-04-28 VITALS
HEIGHT: 70 IN | DIASTOLIC BLOOD PRESSURE: 89 MMHG | SYSTOLIC BLOOD PRESSURE: 177 MMHG | WEIGHT: 265 LBS | HEART RATE: 107 BPM | BODY MASS INDEX: 37.94 KG/M2 | TEMPERATURE: 97.9 F

## 2023-04-28 DIAGNOSIS — K50.80 CROHN'S COLITIS: ICD-10-CM

## 2023-04-28 PROCEDURE — 96413 CHEMO IV INFUSION 1 HR: CPT | Performed by: INTERNAL MEDICINE

## 2023-04-28 RX ORDER — PANTOPRAZOLE SODIUM 40 MG/1
TAKE ONE TABLET BY MOUTH TWICE A DAY TABLET, DELAYED RELEASE ORAL
Qty: 60 TABLET | Refills: 6 | Status: ACTIVE | COMMUNITY

## 2023-04-28 RX ORDER — ONDANSETRON 4 MG/1
1 TABLET ON THE TONGUE AND ALLOW TO DISSOLVE TABLET, ORALLY DISINTEGRATING ORAL
Qty: 30 TABLET | Refills: 0 | Status: ACTIVE | COMMUNITY
Start: 2022-08-31

## 2023-04-28 RX ORDER — ADALIMUMAB 40MG/0.4ML
1 SUBQ Q  WEEK KIT SUBCUTANEOUS
Qty: 12 | Refills: 3 | Status: ACTIVE | COMMUNITY

## 2023-04-28 RX ORDER — HYDROCODONE BITARTRATE AND ACETAMINOPHEN 7.5; 325 MG/1; MG/1
TAKE ONE TABLET BY MOUTH EVERY 4 HOURS AS NEEDED PAIN TABLET ORAL
Qty: 15 | Refills: 0 | Status: ACTIVE | COMMUNITY

## 2023-04-28 RX ORDER — CYCLOBENZAPRINE HYDROCHLORIDE 5 MG/1
TAKE ONCE DAILY PRN SIDE/BACK PAIN TABLET, FILM COATED ORAL ONCE A DAY
Qty: 30 | Refills: 1 | Status: ACTIVE | COMMUNITY

## 2023-04-28 RX ORDER — TIZANIDINE 4 MG/1
TAKE ONE TABLET BY MOUTH AT BEDTIME TABLET ORAL
Qty: 90 | Refills: 0 | Status: ACTIVE | COMMUNITY

## 2023-04-28 RX ORDER — IBUPROFEN 800 MG/1
TAKE ONE TABLET BY MOUTH EVERY 8 HOURS AS NEEDED TABLET, FILM COATED ORAL
Qty: 15 | Refills: 0 | Status: ACTIVE | COMMUNITY

## 2023-04-28 RX ORDER — LISINOPRIL 10 MG/1
TAKE ONE TABLET BY MOUTH ONE TIME DAILY TABLET ORAL
Qty: 90 | Refills: 0 | Status: ACTIVE | COMMUNITY

## 2023-04-28 RX ORDER — DICYCLOMINE HYDROCHLORIDE 10 MG/1
1 CAPSULES CAPSULE ORAL THREE TIMES A DAY
Qty: 90 CAPSULE | Refills: 3 | Status: ACTIVE | COMMUNITY
Start: 2023-01-10 | End: 2023-05-10

## 2023-04-28 RX ORDER — MESALAMINE 375 MG/1
4 CAPSULES IN THE MORNING CAPSULE, EXTENDED RELEASE ORAL ONCE A DAY
Qty: 360 CAPSULE | Refills: 3 | Status: ACTIVE | COMMUNITY
End: 2023-06-18

## 2023-04-28 RX ORDER — HYDROCHLOROTHIAZIDE 25 MG/1
1 TABLET IN THE MORNING TABLET ORAL ONCE A DAY
Status: ACTIVE | COMMUNITY

## 2023-04-28 RX ORDER — AMITRIPTYLINE HYDROCHLORIDE 10 MG/1
1 TABLET AT BEDTIME TABLET, FILM COATED ORAL ONCE A DAY
Qty: 30 | Status: ACTIVE | COMMUNITY

## 2023-04-28 RX ORDER — VEDOLIZUMAB 300 MG/5ML
0,2,6 AND 8 WEEKS INJECTION, POWDER, LYOPHILIZED, FOR SOLUTION INTRAVENOUS
Status: ACTIVE | COMMUNITY
Start: 2023-03-29

## 2023-04-28 RX ORDER — METRONIDAZOLE 500 MG/1
1 TABLET TABLET, FILM COATED ORAL
Qty: 42 TABLET | Refills: 0 | Status: ACTIVE | COMMUNITY

## 2023-05-09 ENCOUNTER — OFFICE VISIT (OUTPATIENT)
Dept: URBAN - NONMETROPOLITAN AREA CLINIC 1 | Facility: CLINIC | Age: 38
End: 2023-05-09
Payer: COMMERCIAL

## 2023-05-09 VITALS
SYSTOLIC BLOOD PRESSURE: 135 MMHG | TEMPERATURE: 97.7 F | DIASTOLIC BLOOD PRESSURE: 94 MMHG | HEIGHT: 70 IN | WEIGHT: 272.8 LBS | BODY MASS INDEX: 39.05 KG/M2

## 2023-05-09 DIAGNOSIS — K50.80 CROHN'S COLITIS: ICD-10-CM

## 2023-05-09 PROCEDURE — 96413 CHEMO IV INFUSION 1 HR: CPT | Performed by: INTERNAL MEDICINE

## 2023-05-09 RX ORDER — HYDROCODONE BITARTRATE AND ACETAMINOPHEN 7.5; 325 MG/1; MG/1
TAKE ONE TABLET BY MOUTH EVERY 4 HOURS AS NEEDED PAIN TABLET ORAL
Qty: 15 | Refills: 0 | Status: ACTIVE | COMMUNITY

## 2023-05-09 RX ORDER — TIZANIDINE 4 MG/1
TAKE ONE TABLET BY MOUTH AT BEDTIME TABLET ORAL
Qty: 90 | Refills: 0 | Status: ACTIVE | COMMUNITY

## 2023-05-09 RX ORDER — DICYCLOMINE HYDROCHLORIDE 10 MG/1
1 CAPSULES CAPSULE ORAL THREE TIMES A DAY
Qty: 90 CAPSULE | Refills: 3 | Status: ACTIVE | COMMUNITY
Start: 2023-01-10 | End: 2023-05-10

## 2023-05-09 RX ORDER — PANTOPRAZOLE SODIUM 40 MG/1
TAKE ONE TABLET BY MOUTH TWICE A DAY TABLET, DELAYED RELEASE ORAL
Qty: 60 TABLET | Refills: 6 | Status: ACTIVE | COMMUNITY

## 2023-05-09 RX ORDER — ADALIMUMAB 40MG/0.4ML
1 SUBQ Q  WEEK KIT SUBCUTANEOUS
Qty: 12 | Refills: 3 | Status: ACTIVE | COMMUNITY

## 2023-05-09 RX ORDER — HYDROCHLOROTHIAZIDE 25 MG/1
1 TABLET IN THE MORNING TABLET ORAL ONCE A DAY
Status: ACTIVE | COMMUNITY

## 2023-05-09 RX ORDER — CYCLOBENZAPRINE HYDROCHLORIDE 5 MG/1
TAKE ONCE DAILY PRN SIDE/BACK PAIN TABLET, FILM COATED ORAL ONCE A DAY
Qty: 30 | Refills: 1 | Status: ACTIVE | COMMUNITY

## 2023-05-09 RX ORDER — IBUPROFEN 800 MG/1
TAKE ONE TABLET BY MOUTH EVERY 8 HOURS AS NEEDED TABLET, FILM COATED ORAL
Qty: 15 | Refills: 0 | Status: ACTIVE | COMMUNITY

## 2023-05-09 RX ORDER — LISINOPRIL 10 MG/1
TAKE ONE TABLET BY MOUTH ONE TIME DAILY TABLET ORAL
Qty: 90 | Refills: 0 | Status: ACTIVE | COMMUNITY

## 2023-05-09 RX ORDER — METRONIDAZOLE 500 MG/1
1 TABLET TABLET, FILM COATED ORAL
Qty: 42 TABLET | Refills: 0 | Status: ACTIVE | COMMUNITY

## 2023-05-09 RX ORDER — AMITRIPTYLINE HYDROCHLORIDE 10 MG/1
1 TABLET AT BEDTIME TABLET, FILM COATED ORAL ONCE A DAY
Qty: 30 | Status: ACTIVE | COMMUNITY

## 2023-05-09 RX ORDER — VEDOLIZUMAB 300 MG/5ML
0,2,6 AND 8 WEEKS INJECTION, POWDER, LYOPHILIZED, FOR SOLUTION INTRAVENOUS
Status: ACTIVE | COMMUNITY
Start: 2023-03-29

## 2023-05-09 RX ORDER — MESALAMINE 375 MG/1
4 CAPSULES IN THE MORNING CAPSULE, EXTENDED RELEASE ORAL ONCE A DAY
Qty: 360 CAPSULE | Refills: 3 | Status: ACTIVE | COMMUNITY
End: 2023-06-18

## 2023-05-09 RX ORDER — ONDANSETRON 4 MG/1
1 TABLET ON THE TONGUE AND ALLOW TO DISSOLVE TABLET, ORALLY DISINTEGRATING ORAL
Qty: 30 TABLET | Refills: 0 | Status: ACTIVE | COMMUNITY
Start: 2022-08-31

## 2023-05-13 ENCOUNTER — WEB ENCOUNTER (OUTPATIENT)
Dept: URBAN - NONMETROPOLITAN AREA CLINIC 2 | Facility: CLINIC | Age: 38
End: 2023-05-13

## 2023-05-13 RX ORDER — CYCLOBENZAPRINE HYDROCHLORIDE 5 MG/1
TAKE ONCE DAILY PRN SIDE/BACK PAIN TABLET, FILM COATED ORAL ONCE A DAY
Qty: 30 | Refills: 1 | Status: ACTIVE | COMMUNITY

## 2023-05-13 RX ORDER — VEDOLIZUMAB 300 MG/5ML
0,2,6 AND 8 WEEKS INJECTION, POWDER, LYOPHILIZED, FOR SOLUTION INTRAVENOUS
Status: ACTIVE | COMMUNITY
Start: 2023-03-29

## 2023-05-13 RX ORDER — HYDROCHLOROTHIAZIDE 25 MG/1
1 TABLET IN THE MORNING TABLET ORAL ONCE A DAY
Status: ACTIVE | COMMUNITY

## 2023-05-13 RX ORDER — HYDROCODONE BITARTRATE AND ACETAMINOPHEN 7.5; 325 MG/1; MG/1
TAKE ONE TABLET BY MOUTH EVERY 4 HOURS AS NEEDED PAIN TABLET ORAL
Qty: 15 | Refills: 0 | Status: ACTIVE | COMMUNITY

## 2023-05-13 RX ORDER — ADALIMUMAB 40MG/0.4ML
1 SUBQ Q  WEEK KIT SUBCUTANEOUS
Qty: 12 | Refills: 3 | Status: ACTIVE | COMMUNITY

## 2023-05-13 RX ORDER — IBUPROFEN 800 MG/1
TAKE ONE TABLET BY MOUTH EVERY 8 HOURS AS NEEDED TABLET, FILM COATED ORAL
Qty: 15 | Refills: 0 | Status: ACTIVE | COMMUNITY

## 2023-05-13 RX ORDER — TIZANIDINE 4 MG/1
TAKE ONE TABLET BY MOUTH AT BEDTIME TABLET ORAL
Qty: 90 | Refills: 0 | Status: ACTIVE | COMMUNITY

## 2023-05-13 RX ORDER — SODIUM, POTASSIUM,MAG SULFATES 17.5-3.13G
177ML SOLUTION, RECONSTITUTED, ORAL ORAL
Qty: 1 | OUTPATIENT
Start: 2023-05-16 | End: 2023-05-18

## 2023-05-13 RX ORDER — PANTOPRAZOLE SODIUM 40 MG/1
TAKE ONE TABLET BY MOUTH TWICE A DAY TABLET, DELAYED RELEASE ORAL
Qty: 60 TABLET | Refills: 6 | Status: ACTIVE | COMMUNITY

## 2023-05-13 RX ORDER — METRONIDAZOLE 500 MG/1
1 TABLET TABLET, FILM COATED ORAL
Qty: 42 TABLET | Refills: 0 | Status: ACTIVE | COMMUNITY

## 2023-05-13 RX ORDER — MESALAMINE 375 MG/1
4 CAPSULES IN THE MORNING CAPSULE, EXTENDED RELEASE ORAL ONCE A DAY
Qty: 360 CAPSULE | Refills: 3 | Status: ACTIVE | COMMUNITY
End: 2023-06-18

## 2023-05-13 RX ORDER — LISINOPRIL 10 MG/1
TAKE ONE TABLET BY MOUTH ONE TIME DAILY TABLET ORAL
Qty: 90 | Refills: 0 | Status: ACTIVE | COMMUNITY

## 2023-05-13 RX ORDER — AMITRIPTYLINE HYDROCHLORIDE 10 MG/1
1 TABLET AT BEDTIME TABLET, FILM COATED ORAL ONCE A DAY
Qty: 30 | Status: ACTIVE | COMMUNITY

## 2023-05-13 RX ORDER — ONDANSETRON 4 MG/1
1 TABLET ON THE TONGUE AND ALLOW TO DISSOLVE TABLET, ORALLY DISINTEGRATING ORAL
Qty: 30 TABLET | Refills: 0 | Status: ACTIVE | COMMUNITY
Start: 2022-08-31

## 2023-05-16 ENCOUNTER — LAB OUTSIDE AN ENCOUNTER (OUTPATIENT)
Dept: URBAN - NONMETROPOLITAN AREA CLINIC 2 | Facility: CLINIC | Age: 38
End: 2023-05-16

## 2023-05-18 ENCOUNTER — OFFICE VISIT (OUTPATIENT)
Dept: URBAN - NONMETROPOLITAN AREA SURGERY CENTER 1 | Facility: SURGERY CENTER | Age: 38
End: 2023-05-18
Payer: COMMERCIAL

## 2023-05-18 ENCOUNTER — TELEPHONE ENCOUNTER (OUTPATIENT)
Dept: URBAN - NONMETROPOLITAN AREA CLINIC 2 | Facility: CLINIC | Age: 38
End: 2023-05-18

## 2023-05-18 ENCOUNTER — CLAIMS CREATED FROM THE CLAIM WINDOW (OUTPATIENT)
Dept: URBAN - METROPOLITAN AREA CLINIC 4 | Facility: CLINIC | Age: 38
End: 2023-05-18
Payer: COMMERCIAL

## 2023-05-18 DIAGNOSIS — K51.80 OTHER ULCERATIVE COLITIS WITHOUT COMPLICATIONS: ICD-10-CM

## 2023-05-18 DIAGNOSIS — K50.111 CROHN'S COLITIS, WITH RECTAL BLEEDING: ICD-10-CM

## 2023-05-18 PROCEDURE — 88305 TISSUE EXAM BY PATHOLOGIST: CPT | Performed by: PATHOLOGY

## 2023-05-18 PROCEDURE — 45380 COLONOSCOPY AND BIOPSY: CPT | Performed by: INTERNAL MEDICINE

## 2023-05-18 PROCEDURE — G8907 PT DOC NO EVENTS ON DISCHARG: HCPCS | Performed by: INTERNAL MEDICINE

## 2023-05-18 RX ORDER — METRONIDAZOLE 500 MG/1
1 TABLET TABLET, FILM COATED ORAL
Qty: 42 TABLET | Refills: 0 | Status: ACTIVE | COMMUNITY

## 2023-05-18 RX ORDER — LISINOPRIL 10 MG/1
TAKE ONE TABLET BY MOUTH ONE TIME DAILY TABLET ORAL
Qty: 90 | Refills: 0 | Status: ACTIVE | COMMUNITY

## 2023-05-18 RX ORDER — AMITRIPTYLINE HYDROCHLORIDE 10 MG/1
1 TABLET AT BEDTIME TABLET, FILM COATED ORAL ONCE A DAY
Qty: 30 | Status: ACTIVE | COMMUNITY

## 2023-05-18 RX ORDER — SODIUM, POTASSIUM,MAG SULFATES 17.5-3.13G
177ML SOLUTION, RECONSTITUTED, ORAL ORAL
Qty: 1 | Status: ACTIVE | COMMUNITY
Start: 2023-05-16 | End: 2023-05-18

## 2023-05-18 RX ORDER — ADALIMUMAB 40MG/0.4ML
1 SUBQ Q  WEEK KIT SUBCUTANEOUS
Qty: 12 | Refills: 3 | Status: ACTIVE | COMMUNITY

## 2023-05-18 RX ORDER — ONDANSETRON 4 MG/1
1 TABLET ON THE TONGUE AND ALLOW TO DISSOLVE TABLET, ORALLY DISINTEGRATING ORAL ONCE A DAY
Qty: 30 | Refills: 3 | OUTPATIENT
Start: 2023-05-18

## 2023-05-18 RX ORDER — HYDROCHLOROTHIAZIDE 25 MG/1
1 TABLET IN THE MORNING TABLET ORAL ONCE A DAY
Status: ACTIVE | COMMUNITY

## 2023-05-18 RX ORDER — MESALAMINE 375 MG/1
4 CAPSULES IN THE MORNING CAPSULE, EXTENDED RELEASE ORAL ONCE A DAY
Qty: 360 CAPSULE | Refills: 3 | Status: ACTIVE | COMMUNITY
End: 2023-06-18

## 2023-05-18 RX ORDER — CYCLOBENZAPRINE HYDROCHLORIDE 5 MG/1
TAKE ONCE DAILY PRN SIDE/BACK PAIN TABLET, FILM COATED ORAL ONCE A DAY
Qty: 30 | Refills: 1 | Status: ACTIVE | COMMUNITY

## 2023-05-18 RX ORDER — VEDOLIZUMAB 300 MG/5ML
0,2,6 AND 8 WEEKS INJECTION, POWDER, LYOPHILIZED, FOR SOLUTION INTRAVENOUS
Status: ACTIVE | COMMUNITY
Start: 2023-03-29

## 2023-05-18 RX ORDER — ONDANSETRON 4 MG/1
1 TABLET ON THE TONGUE AND ALLOW TO DISSOLVE TABLET, ORALLY DISINTEGRATING ORAL
Qty: 30 TABLET | Refills: 0 | Status: ACTIVE | COMMUNITY
Start: 2022-08-31

## 2023-05-18 RX ORDER — IBUPROFEN 800 MG/1
TAKE ONE TABLET BY MOUTH EVERY 8 HOURS AS NEEDED TABLET, FILM COATED ORAL
Qty: 15 | Refills: 0 | Status: ACTIVE | COMMUNITY

## 2023-05-18 RX ORDER — PANTOPRAZOLE SODIUM 40 MG/1
TAKE ONE TABLET BY MOUTH TWICE A DAY TABLET, DELAYED RELEASE ORAL
Qty: 60 TABLET | Refills: 6 | Status: ACTIVE | COMMUNITY

## 2023-05-18 RX ORDER — TIZANIDINE 4 MG/1
TAKE ONE TABLET BY MOUTH AT BEDTIME TABLET ORAL
Qty: 90 | Refills: 0 | Status: ACTIVE | COMMUNITY

## 2023-05-18 RX ORDER — HYDROCODONE BITARTRATE AND ACETAMINOPHEN 7.5; 325 MG/1; MG/1
TAKE ONE TABLET BY MOUTH EVERY 4 HOURS AS NEEDED PAIN TABLET ORAL
Qty: 15 | Refills: 0 | Status: ACTIVE | COMMUNITY

## 2023-05-22 ENCOUNTER — WEB ENCOUNTER (OUTPATIENT)
Dept: URBAN - NONMETROPOLITAN AREA CLINIC 2 | Facility: CLINIC | Age: 38
End: 2023-05-22

## 2023-05-23 ENCOUNTER — TELEPHONE ENCOUNTER (OUTPATIENT)
Dept: URBAN - NONMETROPOLITAN AREA CLINIC 2 | Facility: CLINIC | Age: 38
End: 2023-05-23

## 2023-05-30 ENCOUNTER — WEB ENCOUNTER (OUTPATIENT)
Dept: URBAN - NONMETROPOLITAN AREA CLINIC 2 | Facility: CLINIC | Age: 38
End: 2023-05-30

## 2023-06-06 ENCOUNTER — TELEPHONE ENCOUNTER (OUTPATIENT)
Dept: URBAN - METROPOLITAN AREA CLINIC 6 | Facility: CLINIC | Age: 38
End: 2023-06-06

## 2023-06-07 ENCOUNTER — OFFICE VISIT (OUTPATIENT)
Dept: URBAN - NONMETROPOLITAN AREA CLINIC 1 | Facility: CLINIC | Age: 38
End: 2023-06-07
Payer: COMMERCIAL

## 2023-06-07 VITALS
HEIGHT: 70 IN | TEMPERATURE: 97.1 F | BODY MASS INDEX: 38.02 KG/M2 | SYSTOLIC BLOOD PRESSURE: 149 MMHG | WEIGHT: 265.6 LBS | DIASTOLIC BLOOD PRESSURE: 81 MMHG

## 2023-06-07 DIAGNOSIS — K50.80 CROHN'S COLITIS: ICD-10-CM

## 2023-06-07 PROCEDURE — 96413 CHEMO IV INFUSION 1 HR: CPT | Performed by: INTERNAL MEDICINE

## 2023-06-07 RX ORDER — TIZANIDINE 4 MG/1
TAKE ONE TABLET BY MOUTH AT BEDTIME TABLET ORAL
Qty: 90 | Refills: 0 | Status: ACTIVE | COMMUNITY

## 2023-06-07 RX ORDER — ONDANSETRON 4 MG/1
1 TABLET ON THE TONGUE AND ALLOW TO DISSOLVE TABLET, ORALLY DISINTEGRATING ORAL
Qty: 30 TABLET | Refills: 0 | Status: ACTIVE | COMMUNITY
Start: 2022-08-31

## 2023-06-07 RX ORDER — ADALIMUMAB 40MG/0.4ML
1 SUBQ Q  WEEK KIT SUBCUTANEOUS
Qty: 12 | Refills: 3 | Status: ACTIVE | COMMUNITY

## 2023-06-07 RX ORDER — PANTOPRAZOLE SODIUM 40 MG/1
TAKE ONE TABLET BY MOUTH TWICE A DAY TABLET, DELAYED RELEASE ORAL
Qty: 60 TABLET | Refills: 6 | Status: ACTIVE | COMMUNITY

## 2023-06-07 RX ORDER — MESALAMINE 375 MG/1
4 CAPSULES IN THE MORNING CAPSULE, EXTENDED RELEASE ORAL ONCE A DAY
Qty: 360 CAPSULE | Refills: 3 | Status: ACTIVE | COMMUNITY
End: 2023-06-18

## 2023-06-07 RX ORDER — METRONIDAZOLE 500 MG/1
1 TABLET TABLET, FILM COATED ORAL
Qty: 42 TABLET | Refills: 0 | Status: ACTIVE | COMMUNITY

## 2023-06-07 RX ORDER — HYDROCODONE BITARTRATE AND ACETAMINOPHEN 7.5; 325 MG/1; MG/1
TAKE ONE TABLET BY MOUTH EVERY 4 HOURS AS NEEDED PAIN TABLET ORAL
Qty: 15 | Refills: 0 | Status: ACTIVE | COMMUNITY

## 2023-06-07 RX ORDER — ONDANSETRON 4 MG/1
1 TABLET ON THE TONGUE AND ALLOW TO DISSOLVE TABLET, ORALLY DISINTEGRATING ORAL ONCE A DAY
Qty: 30 | Refills: 3 | Status: ACTIVE | COMMUNITY
Start: 2023-05-18

## 2023-06-07 RX ORDER — HYDROCHLOROTHIAZIDE 25 MG/1
1 TABLET IN THE MORNING TABLET ORAL ONCE A DAY
Status: ACTIVE | COMMUNITY

## 2023-06-07 RX ORDER — IBUPROFEN 800 MG/1
TAKE ONE TABLET BY MOUTH EVERY 8 HOURS AS NEEDED TABLET, FILM COATED ORAL
Qty: 15 | Refills: 0 | Status: ACTIVE | COMMUNITY

## 2023-06-07 RX ORDER — LISINOPRIL 10 MG/1
TAKE ONE TABLET BY MOUTH ONE TIME DAILY TABLET ORAL
Qty: 90 | Refills: 0 | Status: ACTIVE | COMMUNITY

## 2023-06-07 RX ORDER — AMITRIPTYLINE HYDROCHLORIDE 10 MG/1
1 TABLET AT BEDTIME TABLET, FILM COATED ORAL ONCE A DAY
Qty: 30 | Status: ACTIVE | COMMUNITY

## 2023-06-07 RX ORDER — VEDOLIZUMAB 300 MG/5ML
0,2,6 AND 8 WEEKS INJECTION, POWDER, LYOPHILIZED, FOR SOLUTION INTRAVENOUS
Status: ACTIVE | COMMUNITY
Start: 2023-03-29

## 2023-06-07 RX ORDER — CYCLOBENZAPRINE HYDROCHLORIDE 5 MG/1
TAKE ONCE DAILY PRN SIDE/BACK PAIN TABLET, FILM COATED ORAL ONCE A DAY
Qty: 30 | Refills: 1 | Status: ACTIVE | COMMUNITY

## 2023-06-09 ENCOUNTER — WEB ENCOUNTER (OUTPATIENT)
Dept: URBAN - NONMETROPOLITAN AREA CLINIC 2 | Facility: CLINIC | Age: 38
End: 2023-06-09

## 2023-06-09 RX ORDER — AMITRIPTYLINE HYDROCHLORIDE 10 MG/1
1 TABLET AT BEDTIME TABLET, FILM COATED ORAL ONCE A DAY
Qty: 30 | Refills: 11 | OUTPATIENT
Start: 2023-06-12

## 2023-06-12 ENCOUNTER — WEB ENCOUNTER (OUTPATIENT)
Dept: URBAN - NONMETROPOLITAN AREA CLINIC 2 | Facility: CLINIC | Age: 38
End: 2023-06-12

## 2023-06-13 ENCOUNTER — WEB ENCOUNTER (OUTPATIENT)
Dept: URBAN - NONMETROPOLITAN AREA CLINIC 2 | Facility: CLINIC | Age: 38
End: 2023-06-13

## 2023-06-14 ENCOUNTER — WEB ENCOUNTER (OUTPATIENT)
Dept: URBAN - NONMETROPOLITAN AREA CLINIC 2 | Facility: CLINIC | Age: 38
End: 2023-06-14

## 2023-06-15 ENCOUNTER — WEB ENCOUNTER (OUTPATIENT)
Dept: URBAN - NONMETROPOLITAN AREA CLINIC 2 | Facility: CLINIC | Age: 38
End: 2023-06-15

## 2023-06-15 RX ORDER — HYDROCHLOROTHIAZIDE 25 MG/1
1 TABLET IN THE MORNING TABLET ORAL ONCE A DAY
Status: ACTIVE | COMMUNITY

## 2023-06-15 RX ORDER — IBUPROFEN 800 MG/1
TAKE ONE TABLET BY MOUTH EVERY 8 HOURS AS NEEDED TABLET, FILM COATED ORAL
Qty: 15 | Refills: 0 | Status: ACTIVE | COMMUNITY

## 2023-06-15 RX ORDER — MESALAMINE 375 MG/1
4 CAPSULES IN THE MORNING CAPSULE, EXTENDED RELEASE ORAL ONCE A DAY
Qty: 360 CAPSULE | Refills: 3 | Status: ACTIVE | COMMUNITY
End: 2023-06-18

## 2023-06-15 RX ORDER — AMITRIPTYLINE HYDROCHLORIDE 10 MG/1
1 TABLET AT BEDTIME TABLET, FILM COATED ORAL ONCE A DAY
Qty: 30 | Status: ACTIVE | COMMUNITY

## 2023-06-15 RX ORDER — TIZANIDINE 4 MG/1
TAKE ONE TABLET BY MOUTH AT BEDTIME TABLET ORAL
Qty: 90 | Refills: 0 | Status: ACTIVE | COMMUNITY

## 2023-06-15 RX ORDER — LISINOPRIL 10 MG/1
TAKE ONE TABLET BY MOUTH ONE TIME DAILY TABLET ORAL
Qty: 90 | Refills: 0 | Status: ACTIVE | COMMUNITY

## 2023-06-15 RX ORDER — UPADACITINIB 45 MG/1
AS DIRECTED TABLET, EXTENDED RELEASE ORAL ONCE A DAY
Qty: 84 | Refills: 0 | OUTPATIENT
Start: 2023-06-19 | End: 2023-09-11

## 2023-06-15 RX ORDER — CYCLOBENZAPRINE HYDROCHLORIDE 5 MG/1
TAKE ONCE DAILY PRN SIDE/BACK PAIN TABLET, FILM COATED ORAL ONCE A DAY
Qty: 30 | Refills: 1 | Status: ACTIVE | COMMUNITY

## 2023-06-15 RX ORDER — METRONIDAZOLE 500 MG/1
1 TABLET TABLET, FILM COATED ORAL
Qty: 42 TABLET | Refills: 0 | Status: ACTIVE | COMMUNITY

## 2023-06-15 RX ORDER — ONDANSETRON 4 MG/1
1 TABLET ON THE TONGUE AND ALLOW TO DISSOLVE TABLET, ORALLY DISINTEGRATING ORAL
Qty: 30 TABLET | Refills: 0 | Status: ACTIVE | COMMUNITY
Start: 2022-08-31

## 2023-06-15 RX ORDER — VEDOLIZUMAB 300 MG/5ML
0,2,6 AND 8 WEEKS INJECTION, POWDER, LYOPHILIZED, FOR SOLUTION INTRAVENOUS
Status: ACTIVE | COMMUNITY
Start: 2023-03-29

## 2023-06-15 RX ORDER — ADALIMUMAB 40MG/0.4ML
1 SUBQ Q  WEEK KIT SUBCUTANEOUS
Qty: 12 | Refills: 3 | Status: ACTIVE | COMMUNITY

## 2023-06-15 RX ORDER — UPADACITINIB 30 MG/1
1 TABLET TABLET, EXTENDED RELEASE ORAL ONCE A DAY
Qty: 30 | Refills: 11 | OUTPATIENT
Start: 2023-06-19 | End: 2024-06-13

## 2023-06-15 RX ORDER — AMITRIPTYLINE HYDROCHLORIDE 10 MG/1
1 TABLET AT BEDTIME TABLET, FILM COATED ORAL ONCE A DAY
Qty: 30 | Refills: 11 | Status: ACTIVE | COMMUNITY
Start: 2023-06-12

## 2023-06-15 RX ORDER — PANTOPRAZOLE SODIUM 40 MG/1
TAKE ONE TABLET BY MOUTH TWICE A DAY TABLET, DELAYED RELEASE ORAL
Qty: 60 TABLET | Refills: 6 | Status: ACTIVE | COMMUNITY

## 2023-06-15 RX ORDER — ONDANSETRON 4 MG/1
1 TABLET ON THE TONGUE AND ALLOW TO DISSOLVE TABLET, ORALLY DISINTEGRATING ORAL ONCE A DAY
Qty: 30 | Refills: 3 | Status: ACTIVE | COMMUNITY
Start: 2023-05-18

## 2023-06-15 RX ORDER — HYDROCODONE BITARTRATE AND ACETAMINOPHEN 7.5; 325 MG/1; MG/1
TAKE ONE TABLET BY MOUTH EVERY 4 HOURS AS NEEDED PAIN TABLET ORAL
Qty: 15 | Refills: 0 | Status: ACTIVE | COMMUNITY

## 2023-06-28 ENCOUNTER — WEB ENCOUNTER (OUTPATIENT)
Dept: URBAN - NONMETROPOLITAN AREA CLINIC 2 | Facility: CLINIC | Age: 38
End: 2023-06-28

## 2023-07-05 ENCOUNTER — WEB ENCOUNTER (OUTPATIENT)
Dept: URBAN - NONMETROPOLITAN AREA CLINIC 2 | Facility: CLINIC | Age: 38
End: 2023-07-05

## 2023-07-14 ENCOUNTER — OFFICE VISIT (OUTPATIENT)
Dept: URBAN - NONMETROPOLITAN AREA CLINIC 2 | Facility: CLINIC | Age: 38
End: 2023-07-14
Payer: COMMERCIAL

## 2023-07-14 VITALS
BODY MASS INDEX: 38.22 KG/M2 | WEIGHT: 267 LBS | HEART RATE: 81 BPM | DIASTOLIC BLOOD PRESSURE: 87 MMHG | SYSTOLIC BLOOD PRESSURE: 138 MMHG | HEIGHT: 70 IN | TEMPERATURE: 98.1 F

## 2023-07-14 DIAGNOSIS — K50.111 CROHN'S DISEASE OF COLON WITH RECTAL BLEEDING: ICD-10-CM

## 2023-07-14 DIAGNOSIS — R74.8 ELEVATED LIVER ENZYMES: ICD-10-CM

## 2023-07-14 DIAGNOSIS — D84.9 IMMUNOSUPPRESSED STATUS: ICD-10-CM

## 2023-07-14 PROCEDURE — 99214 OFFICE O/P EST MOD 30 MIN: CPT | Performed by: NURSE PRACTITIONER

## 2023-07-14 RX ORDER — AMITRIPTYLINE HYDROCHLORIDE 10 MG/1
1 TABLET AT BEDTIME TABLET, FILM COATED ORAL ONCE A DAY
Qty: 30 | Refills: 11 | Status: ACTIVE | COMMUNITY
Start: 2023-06-12

## 2023-07-14 RX ORDER — LISINOPRIL 10 MG/1
TAKE ONE TABLET BY MOUTH ONE TIME DAILY TABLET ORAL
Qty: 90 | Refills: 0 | Status: ACTIVE | COMMUNITY

## 2023-07-14 RX ORDER — VEDOLIZUMAB 300 MG/5ML
0,2,6 AND 8 WEEKS INJECTION, POWDER, LYOPHILIZED, FOR SOLUTION INTRAVENOUS
Status: ACTIVE | COMMUNITY
Start: 2023-03-29

## 2023-07-14 RX ORDER — ONDANSETRON 4 MG/1
1 TABLET ON THE TONGUE AND ALLOW TO DISSOLVE TABLET, ORALLY DISINTEGRATING ORAL
Qty: 30 TABLET | Refills: 0 | Status: ACTIVE | COMMUNITY
Start: 2022-08-31

## 2023-07-14 RX ORDER — AMITRIPTYLINE HYDROCHLORIDE 10 MG/1
1 TABLET AT BEDTIME TABLET, FILM COATED ORAL ONCE A DAY
Qty: 30 | Status: ACTIVE | COMMUNITY

## 2023-07-14 RX ORDER — UPADACITINIB 45 MG/1
AS DIRECTED TABLET, EXTENDED RELEASE ORAL ONCE A DAY
Qty: 84 | Refills: 0 | Status: ACTIVE | COMMUNITY
Start: 2023-06-19 | End: 2023-09-11

## 2023-07-14 RX ORDER — ONDANSETRON 4 MG/1
1 TABLET ON THE TONGUE AND ALLOW TO DISSOLVE TABLET, ORALLY DISINTEGRATING ORAL ONCE A DAY
Qty: 30 | Refills: 3 | Status: ACTIVE | COMMUNITY
Start: 2023-05-18

## 2023-07-14 RX ORDER — TIZANIDINE 4 MG/1
TAKE ONE TABLET BY MOUTH AT BEDTIME TABLET ORAL
Qty: 90 | Refills: 0 | Status: ACTIVE | COMMUNITY

## 2023-07-14 RX ORDER — METRONIDAZOLE 500 MG/1
1 TABLET TABLET, FILM COATED ORAL
Qty: 42 TABLET | Refills: 0 | Status: ACTIVE | COMMUNITY

## 2023-07-14 RX ORDER — HYDROCODONE BITARTRATE AND ACETAMINOPHEN 7.5; 325 MG/1; MG/1
TAKE ONE TABLET BY MOUTH EVERY 4 HOURS AS NEEDED PAIN TABLET ORAL
Qty: 15 | Refills: 0 | Status: ACTIVE | COMMUNITY

## 2023-07-14 RX ORDER — CYCLOBENZAPRINE HYDROCHLORIDE 5 MG/1
TAKE ONCE DAILY PRN SIDE/BACK PAIN TABLET, FILM COATED ORAL ONCE A DAY
Qty: 30 | Refills: 1 | Status: ACTIVE | COMMUNITY

## 2023-07-14 RX ORDER — ADALIMUMAB 40MG/0.4ML
1 SUBQ Q  WEEK KIT SUBCUTANEOUS
Qty: 12 | Refills: 3 | Status: ACTIVE | COMMUNITY

## 2023-07-14 RX ORDER — IBUPROFEN 800 MG/1
TAKE ONE TABLET BY MOUTH EVERY 8 HOURS AS NEEDED TABLET, FILM COATED ORAL
Qty: 15 | Refills: 0 | Status: ACTIVE | COMMUNITY

## 2023-07-14 RX ORDER — UPADACITINIB 30 MG/1
1 TABLET TABLET, EXTENDED RELEASE ORAL ONCE A DAY
Qty: 30 | Refills: 11 | Status: ACTIVE | COMMUNITY
Start: 2023-06-19 | End: 2024-06-13

## 2023-07-14 RX ORDER — HYDROCHLOROTHIAZIDE 25 MG/1
1 TABLET IN THE MORNING TABLET ORAL ONCE A DAY
Status: ACTIVE | COMMUNITY

## 2023-07-14 RX ORDER — PANTOPRAZOLE SODIUM 40 MG/1
TAKE ONE TABLET BY MOUTH TWICE A DAY TABLET, DELAYED RELEASE ORAL
Qty: 60 TABLET | Refills: 6 | Status: ACTIVE | COMMUNITY

## 2023-07-14 NOTE — HPI-TODAY'S VISIT:
Lance returns for follow-up of Crohn's disease.  He has had a colonoscopy with some mild erythema in his rectosigmoid followed by a flex sig and colonoscopy with patchy colitis in this area.  Path was indeterminant but consistent with Crohn's.  repeat colonoscopy 5/23 with somewhat worsening appearance in recto/sigmoid/desc, but path with inactive colitis. Symptoms tend to improve with antibiotics.  Reports entyvio seems to be helping better than humira, but still having bouts of pain/diarrhea. reports he felt great during induction phase of entyvio, but now, not so much. sb

## 2023-07-15 LAB
A/G RATIO: 1.5
ABSOLUTE BASOPHILS: 63
ABSOLUTE EOSINOPHILS: 351
ABSOLUTE LYMPHOCYTES: 2385
ABSOLUTE MONOCYTES: 468
ABSOLUTE NEUTROPHILS: 5733
ALBUMIN: 4.4
ALKALINE PHOSPHATASE: 68
ALT (SGPT): 40
AST (SGOT): 21
BASOPHILS: 0.7
BILIRUBIN, TOTAL: 0.5
BUN/CREATININE RATIO: (no result)
BUN: 15
CALCIUM: 9.9
CARBON DIOXIDE, TOTAL: 23
CHLORIDE: 103
CHOL/HDLC RATIO: 3.8
CHOLESTEROL, TOTAL: 182
CREATININE: 0.96
EGFR: 104
EOSINOPHILS: 3.9
GLOBULIN, TOTAL: 2.9
GLUCOSE: 131
HDL CHOLESTEROL: 48
HEMATOCRIT: 48.5
HEMOGLOBIN: 16.5
LDL CHOLESTEROL CALC: 110
LYMPHOCYTES: 26.5
MCH: 30.8
MCHC: 34
MCV: 90.7
MONOCYTES: 5.2
MPV: 9
NEUTROPHILS: 63.7
NON HDL CHOLESTEROL: 134
PLATELET COUNT: 346
POTASSIUM: 4
PROTEIN, TOTAL: 7.3
RDW: 12.1
RED BLOOD CELL COUNT: 5.35
SODIUM: 138
TRIGLYCERIDES: 128
WHITE BLOOD CELL COUNT: 9

## 2023-07-18 ENCOUNTER — WEB ENCOUNTER (OUTPATIENT)
Dept: URBAN - NONMETROPOLITAN AREA CLINIC 2 | Facility: CLINIC | Age: 38
End: 2023-07-18

## 2023-07-21 ENCOUNTER — WEB ENCOUNTER (OUTPATIENT)
Dept: URBAN - NONMETROPOLITAN AREA CLINIC 2 | Facility: CLINIC | Age: 38
End: 2023-07-21

## 2023-07-26 ENCOUNTER — WEB ENCOUNTER (OUTPATIENT)
Dept: URBAN - NONMETROPOLITAN AREA CLINIC 2 | Facility: CLINIC | Age: 38
End: 2023-07-26

## 2023-08-02 ENCOUNTER — OFFICE VISIT (OUTPATIENT)
Dept: URBAN - NONMETROPOLITAN AREA CLINIC 1 | Facility: CLINIC | Age: 38
End: 2023-08-02

## 2023-08-06 ENCOUNTER — WEB ENCOUNTER (OUTPATIENT)
Dept: URBAN - NONMETROPOLITAN AREA CLINIC 2 | Facility: CLINIC | Age: 38
End: 2023-08-06

## 2023-08-07 ENCOUNTER — WEB ENCOUNTER (OUTPATIENT)
Dept: URBAN - NONMETROPOLITAN AREA CLINIC 2 | Facility: CLINIC | Age: 38
End: 2023-08-07

## 2023-08-09 ENCOUNTER — WEB ENCOUNTER (OUTPATIENT)
Dept: URBAN - NONMETROPOLITAN AREA CLINIC 2 | Facility: CLINIC | Age: 38
End: 2023-08-09

## 2023-08-23 ENCOUNTER — WEB ENCOUNTER (OUTPATIENT)
Dept: URBAN - NONMETROPOLITAN AREA CLINIC 2 | Facility: CLINIC | Age: 38
End: 2023-08-23

## 2023-09-10 ENCOUNTER — WEB ENCOUNTER (OUTPATIENT)
Dept: URBAN - NONMETROPOLITAN AREA CLINIC 2 | Facility: CLINIC | Age: 38
End: 2023-09-10

## 2023-09-10 RX ORDER — METRONIDAZOLE 500 MG/1
1 TABLET TABLET, FILM COATED ORAL THREE TIMES A DAY
Qty: 42 TABLET | Refills: 0 | OUTPATIENT
Start: 2023-09-11 | End: 2023-09-25

## 2023-10-04 ENCOUNTER — OFFICE VISIT (OUTPATIENT)
Dept: URBAN - NONMETROPOLITAN AREA CLINIC 2 | Facility: CLINIC | Age: 38
End: 2023-10-04
Payer: COMMERCIAL

## 2023-10-04 ENCOUNTER — LAB OUTSIDE AN ENCOUNTER (OUTPATIENT)
Dept: URBAN - NONMETROPOLITAN AREA CLINIC 2 | Facility: CLINIC | Age: 38
End: 2023-10-04

## 2023-10-04 ENCOUNTER — TELEPHONE ENCOUNTER (OUTPATIENT)
Dept: URBAN - NONMETROPOLITAN AREA CLINIC 2 | Facility: CLINIC | Age: 38
End: 2023-10-04

## 2023-10-04 VITALS
WEIGHT: 274 LBS | SYSTOLIC BLOOD PRESSURE: 138 MMHG | HEART RATE: 99 BPM | DIASTOLIC BLOOD PRESSURE: 81 MMHG | HEIGHT: 70 IN | BODY MASS INDEX: 39.22 KG/M2

## 2023-10-04 DIAGNOSIS — K50.111 CROHN'S DISEASE OF COLON WITH RECTAL BLEEDING: ICD-10-CM

## 2023-10-04 DIAGNOSIS — D84.9 IMMUNOSUPPRESSED STATUS: ICD-10-CM

## 2023-10-04 DIAGNOSIS — R74.8 ELEVATED LIVER ENZYMES: ICD-10-CM

## 2023-10-04 PROCEDURE — 99214 OFFICE O/P EST MOD 30 MIN: CPT | Performed by: INTERNAL MEDICINE

## 2023-10-04 RX ORDER — TIZANIDINE 4 MG/1
TAKE ONE TABLET BY MOUTH AT BEDTIME TABLET ORAL
Qty: 90 | Refills: 0 | Status: ACTIVE | COMMUNITY

## 2023-10-04 RX ORDER — AMITRIPTYLINE HYDROCHLORIDE 10 MG/1
1 TABLET AT BEDTIME TABLET, FILM COATED ORAL ONCE A DAY
Qty: 30 | Status: ACTIVE | COMMUNITY

## 2023-10-04 RX ORDER — ADALIMUMAB 40MG/0.4ML
1 SUBQ Q  WEEK KIT SUBCUTANEOUS
Qty: 12 | Refills: 3 | Status: ACTIVE | COMMUNITY

## 2023-10-04 RX ORDER — ONDANSETRON 4 MG/1
1 TABLET ON THE TONGUE AND ALLOW TO DISSOLVE TABLET, ORALLY DISINTEGRATING ORAL
Qty: 30 TABLET | Refills: 0 | Status: ACTIVE | COMMUNITY
Start: 2022-08-31

## 2023-10-04 RX ORDER — METRONIDAZOLE 500 MG/1
1 TABLET TABLET, FILM COATED ORAL
Qty: 42 TABLET | Refills: 0 | Status: ACTIVE | COMMUNITY

## 2023-10-04 RX ORDER — PANTOPRAZOLE SODIUM 40 MG/1
TAKE ONE TABLET BY MOUTH TWICE A DAY TABLET, DELAYED RELEASE ORAL
Qty: 60 TABLET | Refills: 6 | Status: ACTIVE | COMMUNITY

## 2023-10-04 RX ORDER — CYCLOBENZAPRINE HYDROCHLORIDE 5 MG/1
TAKE ONCE DAILY PRN SIDE/BACK PAIN TABLET, FILM COATED ORAL ONCE A DAY
Qty: 30 | Refills: 1 | Status: ACTIVE | COMMUNITY

## 2023-10-04 RX ORDER — IBUPROFEN 800 MG/1
TAKE ONE TABLET BY MOUTH EVERY 8 HOURS AS NEEDED TABLET, FILM COATED ORAL
Qty: 15 | Refills: 0 | Status: ACTIVE | COMMUNITY

## 2023-10-04 RX ORDER — LISINOPRIL 10 MG/1
TAKE ONE TABLET BY MOUTH ONE TIME DAILY TABLET ORAL
Qty: 90 | Refills: 0 | Status: ACTIVE | COMMUNITY

## 2023-10-04 RX ORDER — HYDROCODONE BITARTRATE AND ACETAMINOPHEN 7.5; 325 MG/1; MG/1
TAKE ONE TABLET BY MOUTH EVERY 4 HOURS AS NEEDED PAIN TABLET ORAL
Qty: 15 | Refills: 0 | Status: ACTIVE | COMMUNITY

## 2023-10-04 RX ORDER — UPADACITINIB 30 MG/1
1 TABLET TABLET, EXTENDED RELEASE ORAL ONCE A DAY
Qty: 30 | Refills: 11 | Status: ACTIVE | COMMUNITY
Start: 2023-06-19 | End: 2024-06-13

## 2023-10-04 RX ORDER — ONDANSETRON 4 MG/1
1 TABLET ON THE TONGUE AND ALLOW TO DISSOLVE TABLET, ORALLY DISINTEGRATING ORAL ONCE A DAY
Qty: 30 | Refills: 3 | Status: ACTIVE | COMMUNITY
Start: 2023-05-18

## 2023-10-04 RX ORDER — HYDROCHLOROTHIAZIDE 25 MG/1
1 TABLET IN THE MORNING TABLET ORAL ONCE A DAY
Status: ACTIVE | COMMUNITY

## 2023-10-04 RX ORDER — VEDOLIZUMAB 300 MG/5ML
0,2,6 AND 8 WEEKS INJECTION, POWDER, LYOPHILIZED, FOR SOLUTION INTRAVENOUS
Status: ACTIVE | COMMUNITY
Start: 2023-03-29

## 2023-10-04 RX ORDER — AMITRIPTYLINE HYDROCHLORIDE 10 MG/1
1 TABLET AT BEDTIME TABLET, FILM COATED ORAL ONCE A DAY
Qty: 30 | Refills: 11 | Status: ACTIVE | COMMUNITY
Start: 2023-06-12

## 2023-10-04 NOTE — HPI-TODAY'S VISIT:
10/4/23: Mr. Fletcher returns for follow-up of sigmoid Crohn's disease.  He started Rinvoq since his last clinic visit.  He reports he is still having a lot of burning abdominal pain at night despite the use of Rinvoq for the last 60 days.  Diarrhea is off and on.  Weight is stable.  7/14/23: Lance returns for follow-up of Crohn's disease.  He has had a colonoscopy with some mild erythema in his rectosigmoid followed by a flex sig and colonoscopy with patchy colitis in this area.  Path was indeterminant but consistent with Crohn's.  repeat colonoscopy 5/23 with somewhat worsening appearance in recto/sigmoid/desc, but path with inactive colitis. Symptoms tend to improve with antibiotics.  Reports entyvio seems to be helping better than humira, but still having bouts of pain/diarrhea. reports he felt great during induction phase of entyvio, but now, not so much. sb

## 2023-10-16 ENCOUNTER — WEB ENCOUNTER (OUTPATIENT)
Dept: URBAN - NONMETROPOLITAN AREA CLINIC 2 | Facility: CLINIC | Age: 38
End: 2023-10-16

## 2023-11-12 ENCOUNTER — WEB ENCOUNTER (OUTPATIENT)
Dept: URBAN - NONMETROPOLITAN AREA CLINIC 2 | Facility: CLINIC | Age: 38
End: 2023-11-12

## 2023-12-14 ENCOUNTER — OUT OF OFFICE VISIT (OUTPATIENT)
Dept: URBAN - NONMETROPOLITAN AREA SURGERY CENTER 1 | Facility: SURGERY CENTER | Age: 38
End: 2023-12-14
Payer: COMMERCIAL

## 2023-12-14 DIAGNOSIS — K50.10 CROHN'S DISEASE OF LARGE INTESTINE WITHOUT COMPLICATIONS: ICD-10-CM

## 2023-12-14 DIAGNOSIS — K50.10 CROHN'S DISEASE: ICD-10-CM

## 2023-12-14 PROCEDURE — G8907 PT DOC NO EVENTS ON DISCHARG: HCPCS | Performed by: INTERNAL MEDICINE

## 2023-12-14 PROCEDURE — 45378 DIAGNOSTIC COLONOSCOPY: CPT | Performed by: INTERNAL MEDICINE

## 2023-12-14 PROCEDURE — 00811 ANES LWR INTST NDSC NOS: CPT | Performed by: NURSE ANESTHETIST, CERTIFIED REGISTERED

## 2023-12-14 RX ORDER — VEDOLIZUMAB 300 MG/5ML
0,2,6 AND 8 WEEKS INJECTION, POWDER, LYOPHILIZED, FOR SOLUTION INTRAVENOUS
Status: ACTIVE | COMMUNITY
Start: 2023-03-29

## 2023-12-14 RX ORDER — PANTOPRAZOLE SODIUM 40 MG/1
TAKE ONE TABLET BY MOUTH TWICE A DAY TABLET, DELAYED RELEASE ORAL
Qty: 60 TABLET | Refills: 6 | Status: ACTIVE | COMMUNITY

## 2023-12-14 RX ORDER — TIZANIDINE 4 MG/1
TAKE ONE TABLET BY MOUTH AT BEDTIME TABLET ORAL
Qty: 90 | Refills: 0 | Status: ACTIVE | COMMUNITY

## 2023-12-14 RX ORDER — IBUPROFEN 800 MG/1
TAKE ONE TABLET BY MOUTH EVERY 8 HOURS AS NEEDED TABLET, FILM COATED ORAL
Qty: 15 | Refills: 0 | Status: ACTIVE | COMMUNITY

## 2023-12-14 RX ORDER — METRONIDAZOLE 500 MG/1
1 TABLET TABLET, FILM COATED ORAL
Qty: 42 TABLET | Refills: 0 | Status: ACTIVE | COMMUNITY

## 2023-12-14 RX ORDER — LISINOPRIL 10 MG/1
TAKE ONE TABLET BY MOUTH ONE TIME DAILY TABLET ORAL
Qty: 90 | Refills: 0 | Status: ACTIVE | COMMUNITY

## 2023-12-14 RX ORDER — AMITRIPTYLINE HYDROCHLORIDE 10 MG/1
1 TABLET AT BEDTIME TABLET, FILM COATED ORAL ONCE A DAY
Qty: 30 | Status: ACTIVE | COMMUNITY

## 2023-12-14 RX ORDER — UPADACITINIB 30 MG/1
1 TABLET TABLET, EXTENDED RELEASE ORAL ONCE A DAY
Qty: 30 | Refills: 11 | Status: ACTIVE | COMMUNITY
Start: 2023-06-19 | End: 2024-06-13

## 2023-12-14 RX ORDER — HYDROCHLOROTHIAZIDE 25 MG/1
1 TABLET IN THE MORNING TABLET ORAL ONCE A DAY
Status: ACTIVE | COMMUNITY

## 2023-12-14 RX ORDER — ADALIMUMAB 40MG/0.4ML
1 SUBQ Q  WEEK KIT SUBCUTANEOUS
Qty: 12 | Refills: 3 | Status: ACTIVE | COMMUNITY

## 2023-12-14 RX ORDER — AMITRIPTYLINE HYDROCHLORIDE 10 MG/1
1 TABLET AT BEDTIME TABLET, FILM COATED ORAL ONCE A DAY
Qty: 30 | Refills: 11 | Status: ACTIVE | COMMUNITY
Start: 2023-06-12

## 2023-12-14 RX ORDER — HYDROCODONE BITARTRATE AND ACETAMINOPHEN 7.5; 325 MG/1; MG/1
TAKE ONE TABLET BY MOUTH EVERY 4 HOURS AS NEEDED PAIN TABLET ORAL
Qty: 15 | Refills: 0 | Status: ACTIVE | COMMUNITY

## 2023-12-14 RX ORDER — ONDANSETRON 4 MG/1
1 TABLET ON THE TONGUE AND ALLOW TO DISSOLVE TABLET, ORALLY DISINTEGRATING ORAL ONCE A DAY
Qty: 30 | Refills: 3 | Status: ACTIVE | COMMUNITY
Start: 2023-05-18

## 2023-12-14 RX ORDER — CYCLOBENZAPRINE HYDROCHLORIDE 5 MG/1
TAKE ONCE DAILY PRN SIDE/BACK PAIN TABLET, FILM COATED ORAL ONCE A DAY
Qty: 30 | Refills: 1 | Status: ACTIVE | COMMUNITY

## 2023-12-14 RX ORDER — ONDANSETRON 4 MG/1
1 TABLET ON THE TONGUE AND ALLOW TO DISSOLVE TABLET, ORALLY DISINTEGRATING ORAL
Qty: 30 TABLET | Refills: 0 | Status: ACTIVE | COMMUNITY
Start: 2022-08-31

## 2024-01-01 NOTE — HPI-OTHER HISTORIES
2/24/22 Neg GPP  ammonia 46 liver enzymes WNL except for ALT 53  1/26/22 EGD WNL gastritis and esophagitis on bx  1/26/22 rectal polyp, patchy erythema rectosigmoid that did not have appearance of UC path with hyperplastic polyp and  mild nonspecific inflammation  10/21 CT Sigmoid colon wall is mildly thickened and there is mild basal congestion around the sigmoid colon suggesting infectious or inflammatory disease but is nonspecific 11/21 CT sigmoid colitis 98 Dapsone Pregnancy And Lactation Text: This medication is Pregnancy Category C and is not considered safe during pregnancy or breast feeding.

## 2024-01-10 ENCOUNTER — OFFICE VISIT (OUTPATIENT)
Dept: URBAN - NONMETROPOLITAN AREA CLINIC 2 | Facility: CLINIC | Age: 39
End: 2024-01-10
Payer: COMMERCIAL

## 2024-01-10 VITALS
WEIGHT: 272 LBS | BODY MASS INDEX: 38.94 KG/M2 | DIASTOLIC BLOOD PRESSURE: 81 MMHG | SYSTOLIC BLOOD PRESSURE: 122 MMHG | HEIGHT: 70 IN | HEART RATE: 98 BPM

## 2024-01-10 DIAGNOSIS — D84.9 IMMUNOSUPPRESSED STATUS: ICD-10-CM

## 2024-01-10 DIAGNOSIS — R74.8 ELEVATED LIVER ENZYMES: ICD-10-CM

## 2024-01-10 DIAGNOSIS — K50.111 CROHN'S DISEASE OF COLON WITH RECTAL BLEEDING: ICD-10-CM

## 2024-01-10 PROCEDURE — 99214 OFFICE O/P EST MOD 30 MIN: CPT | Performed by: INTERNAL MEDICINE

## 2024-01-10 RX ORDER — ONDANSETRON 4 MG/1
1 TABLET ON THE TONGUE AND ALLOW TO DISSOLVE TABLET, ORALLY DISINTEGRATING ORAL
Qty: 30 TABLET | Refills: 0 | Status: ACTIVE | COMMUNITY
Start: 2022-08-31

## 2024-01-10 RX ORDER — AMITRIPTYLINE HYDROCHLORIDE 10 MG/1
1 TABLET AT BEDTIME TABLET, FILM COATED ORAL ONCE A DAY
Qty: 30 | Status: ACTIVE | COMMUNITY

## 2024-01-10 RX ORDER — PANTOPRAZOLE SODIUM 40 MG/1
TAKE ONE TABLET BY MOUTH TWICE A DAY TABLET, DELAYED RELEASE ORAL
Qty: 60 TABLET | Refills: 6 | Status: ACTIVE | COMMUNITY

## 2024-01-10 RX ORDER — IBUPROFEN 800 MG/1
TAKE ONE TABLET BY MOUTH EVERY 8 HOURS AS NEEDED TABLET, FILM COATED ORAL
Qty: 15 | Refills: 0 | Status: ACTIVE | COMMUNITY

## 2024-01-10 RX ORDER — METRONIDAZOLE 500 MG/1
1 TABLET TABLET, FILM COATED ORAL
Qty: 42 TABLET | Refills: 0 | Status: ACTIVE | COMMUNITY

## 2024-01-10 RX ORDER — UPADACITINIB 30 MG/1
1 TABLET TABLET, EXTENDED RELEASE ORAL ONCE A DAY
Qty: 30 | Refills: 11 | Status: ACTIVE | COMMUNITY
Start: 2023-06-19 | End: 2024-06-13

## 2024-01-10 RX ORDER — LISINOPRIL 10 MG/1
TAKE ONE TABLET BY MOUTH ONE TIME DAILY TABLET ORAL
Qty: 90 | Refills: 0 | Status: ACTIVE | COMMUNITY

## 2024-01-10 RX ORDER — ADALIMUMAB 40MG/0.4ML
1 SUBQ Q  WEEK KIT SUBCUTANEOUS
Qty: 12 | Refills: 3 | Status: ACTIVE | COMMUNITY

## 2024-01-10 RX ORDER — CYCLOBENZAPRINE HYDROCHLORIDE 5 MG/1
TAKE ONCE DAILY PRN SIDE/BACK PAIN TABLET, FILM COATED ORAL ONCE A DAY
Qty: 30 | Refills: 1 | Status: ACTIVE | COMMUNITY

## 2024-01-10 RX ORDER — TIZANIDINE 4 MG/1
TAKE ONE TABLET BY MOUTH AT BEDTIME TABLET ORAL
Qty: 90 | Refills: 0 | Status: ACTIVE | COMMUNITY

## 2024-01-10 RX ORDER — ONDANSETRON 4 MG/1
1 TABLET ON THE TONGUE AND ALLOW TO DISSOLVE TABLET, ORALLY DISINTEGRATING ORAL ONCE A DAY
Qty: 30 | Refills: 3 | Status: ACTIVE | COMMUNITY
Start: 2023-05-18

## 2024-01-10 RX ORDER — HYDROCHLOROTHIAZIDE 25 MG/1
1 TABLET IN THE MORNING TABLET ORAL ONCE A DAY
Status: ACTIVE | COMMUNITY

## 2024-01-10 RX ORDER — AMITRIPTYLINE HYDROCHLORIDE 10 MG/1
1 TABLET AT BEDTIME TABLET, FILM COATED ORAL ONCE A DAY
Qty: 30 | Refills: 11 | Status: ACTIVE | COMMUNITY
Start: 2023-06-12

## 2024-01-10 RX ORDER — VEDOLIZUMAB 300 MG/5ML
0,2,6 AND 8 WEEKS INJECTION, POWDER, LYOPHILIZED, FOR SOLUTION INTRAVENOUS
Status: ACTIVE | COMMUNITY
Start: 2023-03-29

## 2024-01-10 RX ORDER — HYDROCODONE BITARTRATE AND ACETAMINOPHEN 7.5; 325 MG/1; MG/1
TAKE ONE TABLET BY MOUTH EVERY 4 HOURS AS NEEDED PAIN TABLET ORAL
Qty: 15 | Refills: 0 | Status: ACTIVE | COMMUNITY

## 2024-01-10 NOTE — PHYSICAL EXAM MUSCULOSKELETAL:
Chief Complaint:  Sy Martinez is here today for   Chief Complaint   Patient presents with   • Follow-up     6 month         Medications: medications verified and updated  Refills needed today?  YES  denies Latex allergy or sensitivity  Tobacco history: verified  Health Maintenance Due   Topic Date Due   • Shingles Vaccine (1 of 2) Never done   • Diabetes A1C  01/29/2021   • Depression Screening  07/29/2021     Patient is due for topics as listed above but is not proceeding with Immunization(s) Shingles and Diabetes A1C at this time. .  Health Maintenance Summary     Shingles Vaccine (1 of 2)  Overdue - never done    Diabetes A1C (Every 3 Months)  Overdue since 1/29/2021    Depression Screening (Yearly)  Due soon on 7/29/2021    Hepatitis B Vaccine (1 of 3 - Risk 3-dose series)  Postponed until 10/29/2030    Diabetes Eye Exam (Yearly)  Next due on 8/19/2021    DM/CKD GFR (Yearly)  Next due on 10/29/2021    Diabetes Foot Exam (Yearly)  Next due on 10/29/2021    Colonoscopy Risk (Every 5 Years)  Next due on 7/12/2022    DTaP/Tdap/Td Vaccine (2 - Td)  Next due on 5/11/2025    Pneumococcal Vaccine 0-64 (2 of 2)  Next due on 3/9/2032    Influenza Vaccine   Completed    COVID-19 Vaccine   Completed    Meningococcal Vaccine   Aged Out    HPV Vaccine   Aged Out          Patient would like communication of their results via:      [facility]    Letter     normal gait and station , no tenderness or deformities present

## 2024-01-10 NOTE — HPI-TODAY'S VISIT:
1/10/24: Mr. Fletcher returns for follow-up of sigmoid Crohn's disease.  Since his last clinic visit, he had a normal ileocolonoscopy with the use of Rinvoq.  Prior colonoscopies demonstrated erythema at the rectosigmoid colon.  Today he reports that for the most part he is doing well.  His "burning" pain is improved interestingly by eating less meat.  No further rectal bleeding.  His abdominal pain is mild and worse at night.  Overall he feels most improved on Rinvoq than any other biologic prior.  10/4/23: Mr. Fletcher returns for follow-up of sigmoid Crohn's disease.  He started Rinvoq since his last clinic visit.  He reports he is still having a lot of burning abdominal pain at night despite the use of Rinvoq for the last 60 days.  Diarrhea is off and on.  Weight is stable.  7/14/23: Lance returns for follow-up of Crohn's disease.  He has had a colonoscopy with some mild erythema in his rectosigmoid followed by a flex sig and colonoscopy with patchy colitis in this area.  Path was indeterminant but consistent with Crohn's.  repeat colonoscopy 5/23 with somewhat worsening appearance in recto/sigmoid/desc, but path with inactive colitis. Symptoms tend to improve with antibiotics.  Reports entyvio seems to be helping better than humira, but still having bouts of pain/diarrhea. reports he felt great during induction phase of entyvio, but now, not so much. sb

## 2024-01-12 LAB
A/G RATIO: 2.1
ABSOLUTE BASOPHILS: 28
ABSOLUTE EOSINOPHILS: 28
ABSOLUTE LYMPHOCYTES: 2364
ABSOLUTE MONOCYTES: 348
ABSOLUTE NEUTROPHILS: 4331
ALBUMIN: 4.6
ALKALINE PHOSPHATASE: 50
ALT (SGPT): 58
AST (SGOT): 30
BASOPHILS: 0.4
BILIRUBIN, TOTAL: 0.6
BUN/CREATININE RATIO: (no result)
BUN: 14
C-REACTIVE PROTEIN, QUANT: 4
CALCIUM: 10.2
CARBON DIOXIDE, TOTAL: 27
CHLORIDE: 104
CREATININE: 1.03
EGFR: 96
EOSINOPHILS: 0.4
GLOBULIN, TOTAL: 2.2
GLUCOSE: 127
HEMATOCRIT: 47.4
HEMOGLOBIN: 16.3
LYMPHOCYTES: 33.3
MCH: 32
MCHC: 34.4
MCV: 92.9
MONOCYTES: 4.9
MPV: 9.5
NEUTROPHILS: 61
PLATELET COUNT: 378
POTASSIUM: 4
PROTEIN, TOTAL: 6.8
QUANTIFERON-TB GOLD PLUS: (no result)
RDW: 12.2
RED BLOOD CELL COUNT: 5.1
SED RATE BY MODIFIED: 6
SODIUM: 139
WHITE BLOOD CELL COUNT: 7.1

## 2024-01-16 ENCOUNTER — WEB ENCOUNTER (OUTPATIENT)
Dept: URBAN - NONMETROPOLITAN AREA CLINIC 2 | Facility: CLINIC | Age: 39
End: 2024-01-16

## 2024-01-17 ENCOUNTER — WEB ENCOUNTER (OUTPATIENT)
Dept: URBAN - NONMETROPOLITAN AREA CLINIC 2 | Facility: CLINIC | Age: 39
End: 2024-01-17

## 2024-02-09 ENCOUNTER — OV EP (OUTPATIENT)
Dept: URBAN - NONMETROPOLITAN AREA CLINIC 2 | Facility: CLINIC | Age: 39
End: 2024-02-09

## 2024-04-12 ENCOUNTER — OV EP (OUTPATIENT)
Dept: URBAN - NONMETROPOLITAN AREA CLINIC 2 | Facility: CLINIC | Age: 39
End: 2024-04-12
Payer: COMMERCIAL

## 2024-04-12 VITALS
TEMPERATURE: 97.9 F | DIASTOLIC BLOOD PRESSURE: 85 MMHG | WEIGHT: 278 LBS | HEART RATE: 84 BPM | HEIGHT: 70 IN | BODY MASS INDEX: 39.8 KG/M2 | SYSTOLIC BLOOD PRESSURE: 137 MMHG

## 2024-04-12 DIAGNOSIS — K58.0 IRRITABLE BOWEL SYNDROME WITH DIARRHEA: ICD-10-CM

## 2024-04-12 DIAGNOSIS — K50.111 CROHN'S DISEASE OF COLON WITH RECTAL BLEEDING: ICD-10-CM

## 2024-04-12 DIAGNOSIS — R74.8 ELEVATED LIVER ENZYMES: ICD-10-CM

## 2024-04-12 DIAGNOSIS — D84.9 IMMUNOSUPPRESSED STATUS: ICD-10-CM

## 2024-04-12 PROBLEM — 197125005: Status: ACTIVE | Noted: 2024-04-12

## 2024-04-12 PROCEDURE — 99214 OFFICE O/P EST MOD 30 MIN: CPT | Performed by: NURSE PRACTITIONER

## 2024-04-12 RX ORDER — TIZANIDINE 4 MG/1
TAKE ONE TABLET BY MOUTH AT BEDTIME TABLET ORAL
Qty: 90 | Refills: 0 | Status: ACTIVE | COMMUNITY

## 2024-04-12 RX ORDER — ONDANSETRON 4 MG/1
1 TABLET ON THE TONGUE AND ALLOW TO DISSOLVE TABLET, ORALLY DISINTEGRATING ORAL ONCE A DAY
Qty: 30 | Refills: 3 | Status: ACTIVE | COMMUNITY
Start: 2023-05-18

## 2024-04-12 RX ORDER — HYDROCHLOROTHIAZIDE 25 MG/1
1 TABLET IN THE MORNING TABLET ORAL ONCE A DAY
Status: ACTIVE | COMMUNITY

## 2024-04-12 RX ORDER — ONDANSETRON 4 MG/1
1 TABLET ON THE TONGUE AND ALLOW TO DISSOLVE TABLET, ORALLY DISINTEGRATING ORAL
Qty: 30 TABLET | Refills: 0 | Status: ACTIVE | COMMUNITY
Start: 2022-08-31

## 2024-04-12 RX ORDER — IBUPROFEN 800 MG/1
TAKE ONE TABLET BY MOUTH EVERY 8 HOURS AS NEEDED TABLET, FILM COATED ORAL
Qty: 15 | Refills: 0 | Status: ACTIVE | COMMUNITY

## 2024-04-12 RX ORDER — RIFAXIMIN 550 MG/1
1 TABLET TABLET ORAL THREE TIMES A DAY
Qty: 42 TABLET | Refills: 0 | OUTPATIENT
Start: 2024-04-12 | End: 2024-04-26

## 2024-04-12 RX ORDER — AMITRIPTYLINE HYDROCHLORIDE 10 MG/1
1 TABLET AT BEDTIME TABLET, FILM COATED ORAL ONCE A DAY
Qty: 30 | Status: ACTIVE | COMMUNITY

## 2024-04-12 RX ORDER — AMITRIPTYLINE HYDROCHLORIDE 10 MG/1
1 TABLET AT BEDTIME TABLET, FILM COATED ORAL ONCE A DAY
Qty: 30 | Refills: 11 | Status: ACTIVE | COMMUNITY
Start: 2023-06-12

## 2024-04-12 RX ORDER — CYCLOBENZAPRINE HYDROCHLORIDE 5 MG/1
TAKE ONCE DAILY PRN SIDE/BACK PAIN TABLET, FILM COATED ORAL ONCE A DAY
Qty: 30 | Refills: 1 | Status: ACTIVE | COMMUNITY

## 2024-04-12 RX ORDER — LISINOPRIL 10 MG/1
TAKE ONE TABLET BY MOUTH ONE TIME DAILY TABLET ORAL
Qty: 90 | Refills: 0 | Status: ACTIVE | COMMUNITY

## 2024-04-12 RX ORDER — ADALIMUMAB 40MG/0.4ML
1 SUBQ Q  WEEK KIT SUBCUTANEOUS
Qty: 12 | Refills: 3 | Status: ACTIVE | COMMUNITY

## 2024-04-12 RX ORDER — UPADACITINIB 30 MG/1
1 TABLET TABLET, EXTENDED RELEASE ORAL ONCE A DAY
Qty: 30 | Refills: 11 | Status: ACTIVE | COMMUNITY
Start: 2023-06-19 | End: 2024-06-13

## 2024-04-12 RX ORDER — HYDROCODONE BITARTRATE AND ACETAMINOPHEN 7.5; 325 MG/1; MG/1
TAKE ONE TABLET BY MOUTH EVERY 4 HOURS AS NEEDED PAIN TABLET ORAL
Qty: 15 | Refills: 0 | Status: ACTIVE | COMMUNITY

## 2024-04-12 RX ORDER — PANTOPRAZOLE SODIUM 40 MG/1
TAKE ONE TABLET BY MOUTH TWICE A DAY TABLET, DELAYED RELEASE ORAL
Qty: 60 TABLET | Refills: 6 | Status: ACTIVE | COMMUNITY

## 2024-04-12 RX ORDER — VEDOLIZUMAB 300 MG/5ML
0,2,6 AND 8 WEEKS INJECTION, POWDER, LYOPHILIZED, FOR SOLUTION INTRAVENOUS
Status: ACTIVE | COMMUNITY
Start: 2023-03-29

## 2024-04-12 RX ORDER — METRONIDAZOLE 500 MG/1
1 TABLET TABLET, FILM COATED ORAL
Qty: 42 TABLET | Refills: 0 | Status: ACTIVE | COMMUNITY

## 2024-04-12 NOTE — HPI-TODAY'S VISIT:
4/12/24 Mr. Fletcher returns for follow-up of sigmoid Crohn's disease.  Since his last clinic visit, he had a normal ileocolonoscopy with the use of Rinvoq.  Prior colonoscopies demonstrated erythema at the rectosigmoid colon.  Today he reports that for the most part he is doing well.  still has 4 daytime BMs daily and occasional slight discomfort. complains of bloating.  Overall he feels most improved on Rinvoq than any other biologic prior.sb  10/4/23: Mr. Fletcher returns for follow-up of sigmoid Crohn's disease.  He started Rinvoq since his last clinic visit.  He reports he is still having a lot of burning abdominal pain at night despite the use of Rinvoq for the last 60 days.  Diarrhea is off and on.  Weight is stable.  7/14/23: Lance returns for follow-up of Crohn's disease.  He has had a colonoscopy with some mild erythema in his rectosigmoid followed by a flex sig and colonoscopy with patchy colitis in this area.  Path was indeterminant but consistent with Crohn's.  repeat colonoscopy 5/23 with somewhat worsening appearance in recto/sigmoid/desc, but path with inactive colitis. Symptoms tend to improve with antibiotics.  Reports entyvio seems to be helping better than humira, but still having bouts of pain/diarrhea. reports he felt great during induction phase of entyvio, but now, not so much. sb

## 2024-04-14 LAB
A/G RATIO: 2
ABSOLUTE BASOPHILS: 38
ABSOLUTE EOSINOPHILS: 95
ABSOLUTE LYMPHOCYTES: 2280
ABSOLUTE MONOCYTES: 646
ABSOLUTE NEUTROPHILS: 6441
ALBUMIN: 4.4
ALKALINE PHOSPHATASE: 46
ALT (SGPT): 53
AST (SGOT): 26
BASOPHILS: 0.4
BILIRUBIN, TOTAL: 0.6
BUN/CREATININE RATIO: (no result)
BUN: 17
C-REACTIVE PROTEIN, QUANT: 0.3
CALCIUM: 9.8
CARBON DIOXIDE, TOTAL: 25
CHLORIDE: 104
CREATININE: 1.15
EGFR: 84
EOSINOPHILS: 1
GLOBULIN, TOTAL: 2.2
GLUCOSE: 103
HEMATOCRIT: 43.8
HEMOGLOBIN: 14.6
LYMPHOCYTES: 24
MCH: 31
MCHC: 33.3
MCV: 93
MONOCYTES: 6.8
MPV: 9.2
NEUTROPHILS: 67.8
PLATELET COUNT: 331
POTASSIUM: 3.9
PROTEIN, TOTAL: 6.6
RDW: 12.5
RED BLOOD CELL COUNT: 4.71
SODIUM: 139
WHITE BLOOD CELL COUNT: 9.5

## 2024-04-15 ENCOUNTER — LAB (OUTPATIENT)
Dept: URBAN - NONMETROPOLITAN AREA CLINIC 2 | Facility: CLINIC | Age: 39
End: 2024-04-15

## 2024-05-17 ENCOUNTER — TELEPHONE ENCOUNTER (OUTPATIENT)
Dept: URBAN - NONMETROPOLITAN AREA CLINIC 2 | Facility: CLINIC | Age: 39
End: 2024-05-17

## 2024-06-25 ENCOUNTER — ERX REFILL RESPONSE (OUTPATIENT)
Dept: URBAN - NONMETROPOLITAN AREA CLINIC 2 | Facility: CLINIC | Age: 39
End: 2024-06-25

## 2024-06-25 RX ORDER — PANTOPRAZOLE SODIUM 40 MG/1
TAKE ONE TABLET BY MOUTH TWICE A DAY TABLET, DELAYED RELEASE ORAL
Qty: 60 TABLET | Refills: 6 | OUTPATIENT

## 2024-07-03 ENCOUNTER — WEB ENCOUNTER (OUTPATIENT)
Dept: URBAN - NONMETROPOLITAN AREA CLINIC 2 | Facility: CLINIC | Age: 39
End: 2024-07-03

## 2024-07-16 ENCOUNTER — WEB ENCOUNTER (OUTPATIENT)
Dept: URBAN - NONMETROPOLITAN AREA CLINIC 2 | Facility: CLINIC | Age: 39
End: 2024-07-16

## 2024-10-04 ENCOUNTER — OFFICE VISIT (OUTPATIENT)
Dept: URBAN - NONMETROPOLITAN AREA CLINIC 2 | Facility: CLINIC | Age: 39
End: 2024-10-04
Payer: COMMERCIAL

## 2024-10-04 ENCOUNTER — DASHBOARD ENCOUNTERS (OUTPATIENT)
Age: 39
End: 2024-10-04

## 2024-10-04 DIAGNOSIS — D84.9 IMMUNOSUPPRESSED STATUS: ICD-10-CM

## 2024-10-04 DIAGNOSIS — K50.111 CROHN'S DISEASE OF COLON WITH RECTAL BLEEDING: ICD-10-CM

## 2024-10-04 DIAGNOSIS — R74.8 ELEVATED LIVER ENZYMES: ICD-10-CM

## 2024-10-04 PROCEDURE — 99214 OFFICE O/P EST MOD 30 MIN: CPT | Performed by: INTERNAL MEDICINE

## 2024-10-04 RX ORDER — HYDROCHLOROTHIAZIDE 25 MG/1
1 TABLET IN THE MORNING TABLET ORAL ONCE A DAY
Status: ACTIVE | COMMUNITY

## 2024-10-04 RX ORDER — AMITRIPTYLINE HYDROCHLORIDE 10 MG/1
1 TABLET AT BEDTIME TABLET, FILM COATED ORAL ONCE A DAY
Qty: 30 | Refills: 11 | Status: ACTIVE | COMMUNITY
Start: 2023-06-12

## 2024-10-04 RX ORDER — METRONIDAZOLE 500 MG/1
1 TABLET TABLET, FILM COATED ORAL
Qty: 42 TABLET | Refills: 0 | Status: ACTIVE | COMMUNITY

## 2024-10-04 RX ORDER — AMITRIPTYLINE HYDROCHLORIDE 10 MG/1
1 TABLET AT BEDTIME TABLET, FILM COATED ORAL ONCE A DAY
Qty: 30 | Status: ACTIVE | COMMUNITY

## 2024-10-04 RX ORDER — CYCLOBENZAPRINE HYDROCHLORIDE 5 MG/1
TAKE ONCE DAILY PRN SIDE/BACK PAIN TABLET, FILM COATED ORAL ONCE A DAY
Qty: 30 | Refills: 1 | Status: ACTIVE | COMMUNITY

## 2024-10-04 RX ORDER — VEDOLIZUMAB 300 MG/5ML
0,2,6 AND 8 WEEKS INJECTION, POWDER, LYOPHILIZED, FOR SOLUTION INTRAVENOUS
Status: ACTIVE | COMMUNITY
Start: 2023-03-29

## 2024-10-04 RX ORDER — ADALIMUMAB 40MG/0.4ML
1 SUBQ Q  WEEK KIT SUBCUTANEOUS
Qty: 12 | Refills: 3 | Status: ACTIVE | COMMUNITY

## 2024-10-04 RX ORDER — LISINOPRIL 10 MG/1
TAKE ONE TABLET BY MOUTH ONE TIME DAILY TABLET ORAL
Qty: 90 | Refills: 0 | Status: ACTIVE | COMMUNITY

## 2024-10-04 RX ORDER — IBUPROFEN 800 MG/1
TAKE ONE TABLET BY MOUTH EVERY 8 HOURS AS NEEDED TABLET, FILM COATED ORAL
Qty: 15 | Refills: 0 | Status: ACTIVE | COMMUNITY

## 2024-10-04 RX ORDER — TIZANIDINE 4 MG/1
TAKE ONE TABLET BY MOUTH AT BEDTIME TABLET ORAL
Qty: 90 | Refills: 0 | Status: ACTIVE | COMMUNITY

## 2024-10-04 RX ORDER — PANTOPRAZOLE SODIUM 40 MG/1
TAKE ONE TABLET BY MOUTH TWICE A DAY TABLET, DELAYED RELEASE ORAL
Qty: 60 TABLET | Refills: 6 | Status: ACTIVE | COMMUNITY

## 2024-10-04 RX ORDER — ONDANSETRON 4 MG/1
1 TABLET ON THE TONGUE AND ALLOW TO DISSOLVE TABLET, ORALLY DISINTEGRATING ORAL ONCE A DAY
Qty: 30 | Refills: 3 | Status: ACTIVE | COMMUNITY
Start: 2023-05-18

## 2024-10-04 RX ORDER — ONDANSETRON 4 MG/1
1 TABLET ON THE TONGUE AND ALLOW TO DISSOLVE TABLET, ORALLY DISINTEGRATING ORAL
Qty: 30 TABLET | Refills: 0 | Status: ACTIVE | COMMUNITY
Start: 2022-08-31

## 2024-10-04 RX ORDER — HYDROCODONE BITARTRATE AND ACETAMINOPHEN 7.5; 325 MG/1; MG/1
TAKE ONE TABLET BY MOUTH EVERY 4 HOURS AS NEEDED PAIN TABLET ORAL
Qty: 15 | Refills: 0 | Status: ACTIVE | COMMUNITY

## 2024-10-04 NOTE — HPI-TODAY'S VISIT:
10/4/24: Mr. Fletcher returns for follow-up of sigmoid Crohn's disease.  He is maintained on Rinvoq.  He reports no further rectal bleeding.  BMs are for the most part formed.  He is feeling "98%" better which is a marked improvement from years prior.  He does complain of a bilateral flank pain of uncertain etiology.  He is also having left should pain that is being worked up with MRI.  He gained a lot of weight from steroids over the years and is working to lose this.  He was found to have fatty liver on an U/S done in April by [facility] for his right flank pain.  Gallbladder and biliary tree normal.    4/12/24 Mr. Fletcher returns for follow-up of sigmoid Crohn's disease.  Since his last clinic visit, he had a normal ileocolonoscopy with the use of Rinvoq.  Prior colonoscopies demonstrated erythema at the rectosigmoid colon.  Today he reports that for the most part he is doing well.  still has 4 daytime BMs daily and occasional slight discomfort. complains of bloating.  Overall he feels most improved on Rinvoq than any other biologic prior.sb  10/4/23: Mr. Fletcher returns for follow-up of sigmoid Crohn's disease.  He started Rinvoq since his last clinic visit.  He reports he is still having a lot of burning abdominal pain at night despite the use of Rinvoq for the last 60 days.  Diarrhea is off and on.  Weight is stable.  7/14/23: Lance returns for follow-up of Crohn's disease.  He has had a colonoscopy with some mild erythema in his rectosigmoid followed by a flex sig and colonoscopy with patchy colitis in this area.  Path was indeterminant but consistent with Crohn's.  repeat colonoscopy 5/23 with somewhat worsening appearance in recto/sigmoid/desc, but path with inactive colitis. Symptoms tend to improve with antibiotics.  Reports entyvio seems to be helping better than humira, but still having bouts of pain/diarrhea. reports he felt great during induction phase of entyvio, but now, not so much. sb

## 2024-10-08 LAB
A/G RATIO: 1.9
ABSOLUTE BASOPHILS: 28
ABSOLUTE EOSINOPHILS: 63
ABSOLUTE LYMPHOCYTES: 1911
ABSOLUTE MONOCYTES: 441
ABSOLUTE NEUTROPHILS: 4557
ALBUMIN: 4.6
ALKALINE PHOSPHATASE: 50
ALT (SGPT): 49
AST (SGOT): 24
BASOPHILS: 0.4
BILIRUBIN, TOTAL: 0.5
BUN/CREATININE RATIO: (no result)
BUN: 15
C-REACTIVE PROTEIN, QUANT: <3
CALCIUM: 10
CARBON DIOXIDE, TOTAL: 25
CHLORIDE: 103
CREATININE: 1.08
EGFR: 90
EOSINOPHILS: 0.9
GLOBULIN, TOTAL: 2.4
GLUCOSE: 147
HEMATOCRIT: 46.4
HEMOGLOBIN: 16
LYMPHOCYTES: 27.3
MCH: 32.1
MCHC: 34.5
MCV: 93.2
MITOGEN-NIL: >10
MONOCYTES: 6.3
MPV: 9.1
NEUTROPHILS: 65.1
PLATELET COUNT: 374
POTASSIUM: 4
PROTEIN, TOTAL: 7
QUANTIFERON NIL VALUE: 0.03
QUANTIFERON TB1 AG VALUE: 0
QUANTIFERON TB2 AG VALUE: 0
QUANTIFERON-TB GOLD PLUS: NEGATIVE
RDW: 12.6
RED BLOOD CELL COUNT: 4.98
SED RATE BY MODIFIED: 2
SODIUM: 139
WHITE BLOOD CELL COUNT: 7

## 2024-10-14 ENCOUNTER — WEB ENCOUNTER (OUTPATIENT)
Dept: URBAN - NONMETROPOLITAN AREA CLINIC 2 | Facility: CLINIC | Age: 39
End: 2024-10-14

## 2024-10-22 ENCOUNTER — WEB ENCOUNTER (OUTPATIENT)
Dept: URBAN - NONMETROPOLITAN AREA CLINIC 2 | Facility: CLINIC | Age: 39
End: 2024-10-22

## 2024-10-23 ENCOUNTER — LAB OUTSIDE AN ENCOUNTER (OUTPATIENT)
Dept: URBAN - NONMETROPOLITAN AREA CLINIC 2 | Facility: CLINIC | Age: 39
End: 2024-10-23

## 2024-11-19 ENCOUNTER — WEB ENCOUNTER (OUTPATIENT)
Dept: URBAN - NONMETROPOLITAN AREA CLINIC 2 | Facility: CLINIC | Age: 39
End: 2024-11-19

## 2024-11-20 ENCOUNTER — LAB OUTSIDE AN ENCOUNTER (OUTPATIENT)
Dept: URBAN - NONMETROPOLITAN AREA CLINIC 2 | Facility: CLINIC | Age: 39
End: 2024-11-20

## 2024-11-22 ENCOUNTER — OFFICE VISIT (OUTPATIENT)
Dept: URBAN - NONMETROPOLITAN AREA CLINIC 2 | Facility: CLINIC | Age: 39
End: 2024-11-22

## 2024-11-22 ENCOUNTER — WEB ENCOUNTER (OUTPATIENT)
Dept: URBAN - NONMETROPOLITAN AREA CLINIC 2 | Facility: CLINIC | Age: 39
End: 2024-11-22

## 2024-12-02 ENCOUNTER — TELEPHONE ENCOUNTER (OUTPATIENT)
Dept: URBAN - NONMETROPOLITAN AREA CLINIC 2 | Facility: CLINIC | Age: 39
End: 2024-12-02

## 2024-12-19 ENCOUNTER — CLAIMS CREATED FROM THE CLAIM WINDOW (OUTPATIENT)
Dept: URBAN - METROPOLITAN AREA CLINIC 4 | Facility: CLINIC | Age: 39
End: 2024-12-19
Payer: COMMERCIAL

## 2024-12-19 ENCOUNTER — CLAIMS CREATED FROM THE CLAIM WINDOW (OUTPATIENT)
Dept: URBAN - NONMETROPOLITAN AREA SURGERY CENTER 1 | Facility: SURGERY CENTER | Age: 39
End: 2024-12-19
Payer: COMMERCIAL

## 2024-12-19 ENCOUNTER — OFFICE VISIT (OUTPATIENT)
Dept: URBAN - NONMETROPOLITAN AREA SURGERY CENTER 1 | Facility: SURGERY CENTER | Age: 39
End: 2024-12-19

## 2024-12-19 DIAGNOSIS — K63.89 OTHER SPECIFIED DISEASES OF INTESTINE: ICD-10-CM

## 2024-12-19 DIAGNOSIS — K52.89 OTHER SPECIFIED NONINFECTIVE GASTROENTERITIS AND COLITIS: ICD-10-CM

## 2024-12-19 PROCEDURE — 88305 TISSUE EXAM BY PATHOLOGIST: CPT | Performed by: PATHOLOGY

## 2024-12-19 PROCEDURE — 00811 ANES LWR INTST NDSC NOS: CPT | Performed by: NURSE ANESTHETIST, CERTIFIED REGISTERED

## 2024-12-19 RX ORDER — ONDANSETRON 4 MG/1
1 TABLET ON THE TONGUE AND ALLOW TO DISSOLVE TABLET, ORALLY DISINTEGRATING ORAL ONCE A DAY
Qty: 30 | Refills: 3 | Status: ACTIVE | COMMUNITY
Start: 2023-05-18

## 2024-12-19 RX ORDER — HYDROCODONE BITARTRATE AND ACETAMINOPHEN 7.5; 325 MG/1; MG/1
TAKE ONE TABLET BY MOUTH EVERY 4 HOURS AS NEEDED PAIN TABLET ORAL
Qty: 15 | Refills: 0 | Status: ACTIVE | COMMUNITY

## 2024-12-19 RX ORDER — UPADACITINIB 30 MG/1
1 TABLET TABLET, EXTENDED RELEASE ORAL ONCE A DAY
Status: ACTIVE | COMMUNITY

## 2024-12-19 RX ORDER — CYCLOBENZAPRINE HYDROCHLORIDE 5 MG/1
TAKE ONCE DAILY PRN SIDE/BACK PAIN TABLET, FILM COATED ORAL ONCE A DAY
Qty: 30 | Refills: 1 | Status: ACTIVE | COMMUNITY

## 2024-12-19 RX ORDER — AMITRIPTYLINE HYDROCHLORIDE 10 MG/1
1 TABLET AT BEDTIME TABLET, FILM COATED ORAL ONCE A DAY
Qty: 30 | Status: ACTIVE | COMMUNITY

## 2024-12-19 RX ORDER — HYDROCHLOROTHIAZIDE 25 MG/1
1 TABLET IN THE MORNING TABLET ORAL ONCE A DAY
Status: ACTIVE | COMMUNITY

## 2024-12-19 RX ORDER — LISINOPRIL 10 MG/1
TAKE ONE TABLET BY MOUTH ONE TIME DAILY TABLET ORAL
Qty: 90 | Refills: 0 | Status: ACTIVE | COMMUNITY

## 2024-12-19 RX ORDER — TIZANIDINE 4 MG/1
TAKE ONE TABLET BY MOUTH AT BEDTIME TABLET ORAL
Qty: 90 | Refills: 0 | Status: ACTIVE | COMMUNITY

## 2024-12-19 RX ORDER — PANTOPRAZOLE SODIUM 40 MG/1
TAKE ONE TABLET BY MOUTH TWICE A DAY TABLET, DELAYED RELEASE ORAL
Qty: 60 TABLET | Refills: 6 | Status: ACTIVE | COMMUNITY

## 2024-12-19 RX ORDER — IBUPROFEN 800 MG/1
TAKE ONE TABLET BY MOUTH EVERY 8 HOURS AS NEEDED TABLET, FILM COATED ORAL
Qty: 15 | Refills: 0 | Status: ACTIVE | COMMUNITY

## 2024-12-19 RX ORDER — METRONIDAZOLE 500 MG/1
1 TABLET TABLET, FILM COATED ORAL
Qty: 42 TABLET | Refills: 0 | Status: DISCONTINUED | COMMUNITY

## 2024-12-19 RX ORDER — AMITRIPTYLINE HYDROCHLORIDE 10 MG/1
1 TABLET AT BEDTIME TABLET, FILM COATED ORAL ONCE A DAY
Qty: 30 | Refills: 11 | Status: ACTIVE | COMMUNITY
Start: 2023-06-12

## 2024-12-19 RX ORDER — ADALIMUMAB 40MG/0.4ML
1 SUBQ Q  WEEK KIT SUBCUTANEOUS
Qty: 12 | Refills: 3 | Status: DISCONTINUED | COMMUNITY

## 2024-12-19 RX ORDER — ONDANSETRON 4 MG/1
1 TABLET ON THE TONGUE AND ALLOW TO DISSOLVE TABLET, ORALLY DISINTEGRATING ORAL
Qty: 30 TABLET | Refills: 0 | Status: ACTIVE | COMMUNITY
Start: 2022-08-31

## 2024-12-19 RX ORDER — VEDOLIZUMAB 300 MG/5ML
0,2,6 AND 8 WEEKS INJECTION, POWDER, LYOPHILIZED, FOR SOLUTION INTRAVENOUS
Status: DISCONTINUED | COMMUNITY
Start: 2023-03-29

## 2025-01-13 ENCOUNTER — WEB ENCOUNTER (OUTPATIENT)
Dept: URBAN - NONMETROPOLITAN AREA CLINIC 2 | Facility: CLINIC | Age: 40
End: 2025-01-13

## 2025-01-29 ENCOUNTER — WEB ENCOUNTER (OUTPATIENT)
Dept: URBAN - NONMETROPOLITAN AREA CLINIC 2 | Facility: CLINIC | Age: 40
End: 2025-01-29

## 2025-02-12 ENCOUNTER — WEB ENCOUNTER (OUTPATIENT)
Dept: URBAN - NONMETROPOLITAN AREA CLINIC 2 | Facility: CLINIC | Age: 40
End: 2025-02-12

## 2025-02-16 ENCOUNTER — WEB ENCOUNTER (OUTPATIENT)
Dept: URBAN - NONMETROPOLITAN AREA CLINIC 2 | Facility: CLINIC | Age: 40
End: 2025-02-16

## 2025-02-16 RX ORDER — METRONIDAZOLE 500 MG/1
1 TABLET TABLET ORAL THREE TIMES A DAY
Qty: 42 TABLET | Refills: 0 | OUTPATIENT
Start: 2025-02-16 | End: 2025-03-02

## 2025-02-17 ENCOUNTER — WEB ENCOUNTER (OUTPATIENT)
Dept: URBAN - NONMETROPOLITAN AREA CLINIC 2 | Facility: CLINIC | Age: 40
End: 2025-02-17

## 2025-02-18 ENCOUNTER — WEB ENCOUNTER (OUTPATIENT)
Dept: URBAN - NONMETROPOLITAN AREA CLINIC 2 | Facility: CLINIC | Age: 40
End: 2025-02-18

## 2025-02-19 ENCOUNTER — OFFICE VISIT (OUTPATIENT)
Dept: URBAN - NONMETROPOLITAN AREA CLINIC 2 | Facility: CLINIC | Age: 40
End: 2025-02-19

## 2025-02-19 VITALS
WEIGHT: 276 LBS | SYSTOLIC BLOOD PRESSURE: 126 MMHG | HEIGHT: 70 IN | BODY MASS INDEX: 39.51 KG/M2 | HEART RATE: 94 BPM | DIASTOLIC BLOOD PRESSURE: 90 MMHG

## 2025-02-19 RX ORDER — ONDANSETRON 4 MG/1
1 TABLET ON THE TONGUE AND ALLOW TO DISSOLVE TABLET, ORALLY DISINTEGRATING ORAL ONCE A DAY
Qty: 30 | Refills: 3 | Status: ACTIVE | COMMUNITY
Start: 2023-05-18

## 2025-02-19 RX ORDER — PANTOPRAZOLE SODIUM 40 MG/1
TAKE ONE TABLET BY MOUTH TWICE A DAY TABLET, DELAYED RELEASE ORAL
Qty: 60 TABLET | Refills: 6 | Status: ACTIVE | COMMUNITY

## 2025-02-19 RX ORDER — ONDANSETRON 4 MG/1
1 TABLET ON THE TONGUE AND ALLOW TO DISSOLVE TABLET, ORALLY DISINTEGRATING ORAL
Qty: 30 TABLET | Refills: 0 | Status: ACTIVE | COMMUNITY
Start: 2022-08-31

## 2025-02-19 RX ORDER — HYDROCODONE BITARTRATE AND ACETAMINOPHEN 7.5; 325 MG/1; MG/1
TAKE ONE TABLET BY MOUTH EVERY 4 HOURS AS NEEDED PAIN TABLET ORAL
Qty: 15 | Refills: 0 | Status: ACTIVE | COMMUNITY

## 2025-02-19 RX ORDER — CYCLOBENZAPRINE HYDROCHLORIDE 5 MG/1
TAKE ONCE DAILY PRN SIDE/BACK PAIN TABLET, FILM COATED ORAL ONCE A DAY
Qty: 30 | Refills: 1 | Status: ACTIVE | COMMUNITY

## 2025-02-19 RX ORDER — HYDROCHLOROTHIAZIDE 25 MG/1
1 TABLET IN THE MORNING TABLET ORAL ONCE A DAY
Status: ACTIVE | COMMUNITY

## 2025-02-19 RX ORDER — TIZANIDINE 4 MG/1
TAKE ONE TABLET BY MOUTH AT BEDTIME TABLET ORAL
Qty: 90 | Refills: 0 | Status: ACTIVE | COMMUNITY

## 2025-02-19 RX ORDER — UPADACITINIB 30 MG/1
1 TABLET TABLET, EXTENDED RELEASE ORAL ONCE A DAY
Status: ACTIVE | COMMUNITY

## 2025-02-19 RX ORDER — AMITRIPTYLINE HYDROCHLORIDE 10 MG/1
1 TABLET AT BEDTIME TABLET, FILM COATED ORAL ONCE A DAY
Qty: 30 | Status: ACTIVE | COMMUNITY

## 2025-02-19 RX ORDER — LISINOPRIL 10 MG/1
TAKE ONE TABLET BY MOUTH ONE TIME DAILY TABLET ORAL
Qty: 90 | Refills: 0 | Status: ACTIVE | COMMUNITY

## 2025-02-19 RX ORDER — AMITRIPTYLINE HYDROCHLORIDE 10 MG/1
1 TABLET AT BEDTIME TABLET, FILM COATED ORAL ONCE A DAY
Qty: 30 | Refills: 11 | Status: ACTIVE | COMMUNITY
Start: 2023-06-12

## 2025-02-19 RX ORDER — METRONIDAZOLE 500 MG/1
1 TABLET TABLET ORAL THREE TIMES A DAY
Qty: 42 TABLET | Refills: 0 | Status: ACTIVE | COMMUNITY
Start: 2025-02-16 | End: 2025-03-02

## 2025-02-19 RX ORDER — IBUPROFEN 800 MG/1
TAKE ONE TABLET BY MOUTH EVERY 8 HOURS AS NEEDED TABLET, FILM COATED ORAL
Qty: 15 | Refills: 0 | Status: ACTIVE | COMMUNITY

## 2025-02-19 NOTE — HPI-TODAY'S VISIT:
2/19/25: Mr. Fletcher returns  for follow-up of sigmoid Crohn's disease.  He saw Dr. Lares who is hesistant to perform surgery on him as his pain is in a right sided location and he has not exhausted medical therapy.  His last colonoscopy done in 12/2024 showed active sigmoid colitis, not clearly associated with diverticular disease.  He has been on Rinvoq for about 10 months now without much improvement in symptoms.  He asks about changing medications.  10/4/24: Mr. Fletcher returns for follow-up of sigmoid Crohn's disease.  He is maintained on Rinvoq.  He reports no further rectal bleeding.  BMs are for the most part formed.  He is feeling "98%" better which is a marked improvement from years prior.  He does complain of a bilateral flank pain of uncertain etiology.  He is also having left should pain that is being worked up with MRI.  He gained a lot of weight from steroids over the years and is working to lose this.  He was found to have fatty liver on an U/S done in April by [facility] for his right flank pain.  Gallbladder and biliary tree normal.    4/12/24 Mr. Fletcher returns for follow-up of sigmoid Crohn's disease.  Since his last clinic visit, he had a normal ileocolonoscopy with the use of Rinvoq.  Prior colonoscopies demonstrated erythema at the rectosigmoid colon.  Today he reports that for the most part he is doing well.  still has 4 daytime BMs daily and occasional slight discomfort. complains of bloating.  Overall he feels most improved on Rinvoq than any other biologic prior.sb  10/4/23: Mr. Fletcher returns for follow-up of sigmoid Crohn's disease.  He started Rinvoq since his last clinic visit.  He reports he is still having a lot of burning abdominal pain at night despite the use of Rinvoq for the last 60 days.  Diarrhea is off and on.  Weight is stable.  7/14/23: Lance returns for follow-up of Crohn's disease.  He has had a colonoscopy with some mild erythema in his rectosigmoid followed by a flex sig and colonoscopy with patchy colitis in this area.  Path was indeterminant but consistent with Crohn's.  repeat colonoscopy 5/23 with somewhat worsening appearance in recto/sigmoid/desc, but path with inactive colitis. Symptoms tend to improve with antibiotics.  Reports entyvio seems to be helping better than humira, but still having bouts of pain/diarrhea. reports he felt great during induction phase of entyvio, but now, not so much. sb

## 2025-02-24 ENCOUNTER — LAB OUTSIDE AN ENCOUNTER (OUTPATIENT)
Dept: URBAN - NONMETROPOLITAN AREA CLINIC 2 | Facility: CLINIC | Age: 40
End: 2025-02-24

## 2025-02-24 ENCOUNTER — TELEPHONE ENCOUNTER (OUTPATIENT)
Dept: URBAN - NONMETROPOLITAN AREA CLINIC 2 | Facility: CLINIC | Age: 40
End: 2025-02-24

## 2025-02-26 ENCOUNTER — TELEPHONE ENCOUNTER (OUTPATIENT)
Dept: URBAN - NONMETROPOLITAN AREA CLINIC 2 | Facility: CLINIC | Age: 40
End: 2025-02-26

## 2025-02-26 PROBLEM — 7620006: Status: ACTIVE | Noted: 2025-02-26

## 2025-02-26 RX ORDER — RISANKIZUMAB-RZAA 360 MG/2.4
AT WEEK 12 WEARABLE INJECTOR SUBCUTANEOUS
Qty: 360 | Refills: 0 | OUTPATIENT
Start: 2025-02-26

## 2025-02-26 RX ORDER — RISANKIZUMAB-RZAA 60 MG/ML
AS DIRECTED INJECTION INTRAVENOUS
Qty: 600 | Refills: 0 | OUTPATIENT
Start: 2025-02-26

## 2025-03-13 ENCOUNTER — WEB ENCOUNTER (OUTPATIENT)
Dept: URBAN - NONMETROPOLITAN AREA CLINIC 2 | Facility: CLINIC | Age: 40
End: 2025-03-13

## 2025-03-14 ENCOUNTER — LAB OUTSIDE AN ENCOUNTER (OUTPATIENT)
Dept: URBAN - NONMETROPOLITAN AREA CLINIC 2 | Facility: CLINIC | Age: 40
End: 2025-03-14

## 2025-03-19 LAB
HEPATITIS B CORE AB TOTAL: (no result)
HEPATITIS B SURFACE ANTIGEN: (no result)
MITOGEN-NIL: 4.07
QUANTIFERON NIL VALUE: 0.02
QUANTIFERON TB1 AG VALUE: 0
QUANTIFERON TB2 AG VALUE: 0
QUANTIFERON-TB GOLD PLUS: NEGATIVE

## 2025-03-23 ENCOUNTER — WEB ENCOUNTER (OUTPATIENT)
Dept: URBAN - NONMETROPOLITAN AREA CLINIC 2 | Facility: CLINIC | Age: 40
End: 2025-03-23

## 2025-03-26 ENCOUNTER — TELEPHONE ENCOUNTER (OUTPATIENT)
Dept: URBAN - NONMETROPOLITAN AREA CLINIC 13 | Facility: CLINIC | Age: 40
End: 2025-03-26

## 2025-04-01 ENCOUNTER — WEB ENCOUNTER (OUTPATIENT)
Dept: URBAN - NONMETROPOLITAN AREA CLINIC 2 | Facility: CLINIC | Age: 40
End: 2025-04-01

## 2025-04-02 ENCOUNTER — WEB ENCOUNTER (OUTPATIENT)
Dept: URBAN - NONMETROPOLITAN AREA CLINIC 2 | Facility: CLINIC | Age: 40
End: 2025-04-02

## 2025-04-04 ENCOUNTER — OFFICE VISIT (OUTPATIENT)
Dept: URBAN - NONMETROPOLITAN AREA CLINIC 2 | Facility: CLINIC | Age: 40
End: 2025-04-04

## 2025-04-08 ENCOUNTER — TELEPHONE ENCOUNTER (OUTPATIENT)
Dept: URBAN - METROPOLITAN AREA CLINIC 97 | Facility: CLINIC | Age: 40
End: 2025-04-08

## 2025-04-14 ENCOUNTER — OFFICE VISIT (OUTPATIENT)
Dept: URBAN - NONMETROPOLITAN AREA CLINIC 1 | Facility: CLINIC | Age: 40
End: 2025-04-14
Payer: COMMERCIAL

## 2025-04-14 DIAGNOSIS — K50.10 CROHN'S DISEASE OF LARGE INTESTINE WITHOUT COMPLICATION: ICD-10-CM

## 2025-04-14 PROCEDURE — 96413 CHEMO IV INFUSION 1 HR: CPT | Performed by: INTERNAL MEDICINE

## 2025-04-14 RX ORDER — UPADACITINIB 30 MG/1
1 TABLET TABLET, EXTENDED RELEASE ORAL ONCE A DAY
Status: ACTIVE | COMMUNITY

## 2025-04-14 RX ORDER — CYCLOBENZAPRINE HYDROCHLORIDE 5 MG/1
TAKE ONCE DAILY PRN SIDE/BACK PAIN TABLET, FILM COATED ORAL ONCE A DAY
Qty: 30 | Refills: 1 | Status: ACTIVE | COMMUNITY

## 2025-04-14 RX ORDER — TIZANIDINE 4 MG/1
TAKE ONE TABLET BY MOUTH AT BEDTIME TABLET ORAL
Qty: 90 | Refills: 0 | Status: ACTIVE | COMMUNITY

## 2025-04-14 RX ORDER — RISANKIZUMAB-RZAA 360 MG/2.4
AT WEEK 12 WEARABLE INJECTOR SUBCUTANEOUS
Qty: 360 | Refills: 0 | Status: ACTIVE | COMMUNITY
Start: 2025-02-26

## 2025-04-14 RX ORDER — LISINOPRIL 10 MG/1
TAKE ONE TABLET BY MOUTH ONE TIME DAILY TABLET ORAL
Qty: 90 | Refills: 0 | Status: ACTIVE | COMMUNITY

## 2025-04-14 RX ORDER — PANTOPRAZOLE SODIUM 40 MG/1
TAKE ONE TABLET BY MOUTH TWICE A DAY TABLET, DELAYED RELEASE ORAL
Qty: 60 TABLET | Refills: 6 | Status: ACTIVE | COMMUNITY

## 2025-04-14 RX ORDER — AMITRIPTYLINE HYDROCHLORIDE 10 MG/1
1 TABLET AT BEDTIME TABLET, FILM COATED ORAL ONCE A DAY
Qty: 30 | Status: ACTIVE | COMMUNITY

## 2025-04-14 RX ORDER — AMITRIPTYLINE HYDROCHLORIDE 10 MG/1
1 TABLET AT BEDTIME TABLET, FILM COATED ORAL ONCE A DAY
Qty: 30 | Refills: 11 | Status: ACTIVE | COMMUNITY
Start: 2023-06-12

## 2025-04-14 RX ORDER — RISANKIZUMAB-RZAA 60 MG/ML
AS DIRECTED INJECTION INTRAVENOUS
Qty: 600 | Refills: 0 | Status: ACTIVE | COMMUNITY
Start: 2025-02-26

## 2025-04-14 RX ORDER — IBUPROFEN 800 MG/1
TAKE ONE TABLET BY MOUTH EVERY 8 HOURS AS NEEDED TABLET, FILM COATED ORAL
Qty: 15 | Refills: 0 | Status: ACTIVE | COMMUNITY

## 2025-04-14 RX ORDER — HYDROCHLOROTHIAZIDE 25 MG/1
1 TABLET IN THE MORNING TABLET ORAL ONCE A DAY
Status: ACTIVE | COMMUNITY

## 2025-04-14 RX ORDER — ONDANSETRON 4 MG/1
1 TABLET ON THE TONGUE AND ALLOW TO DISSOLVE TABLET, ORALLY DISINTEGRATING ORAL
Qty: 30 TABLET | Refills: 0 | Status: ACTIVE | COMMUNITY
Start: 2022-08-31

## 2025-04-14 RX ORDER — HYDROCODONE BITARTRATE AND ACETAMINOPHEN 7.5; 325 MG/1; MG/1
TAKE ONE TABLET BY MOUTH EVERY 4 HOURS AS NEEDED PAIN TABLET ORAL
Qty: 15 | Refills: 0 | Status: ACTIVE | COMMUNITY

## 2025-04-14 RX ORDER — ONDANSETRON 4 MG/1
1 TABLET ON THE TONGUE AND ALLOW TO DISSOLVE TABLET, ORALLY DISINTEGRATING ORAL ONCE A DAY
Qty: 30 | Refills: 3 | Status: ACTIVE | COMMUNITY
Start: 2023-05-18

## 2025-05-12 ENCOUNTER — OFFICE VISIT (OUTPATIENT)
Dept: URBAN - NONMETROPOLITAN AREA CLINIC 1 | Facility: CLINIC | Age: 40
End: 2025-05-12
Payer: COMMERCIAL

## 2025-05-12 DIAGNOSIS — K50.10 CROHN'S DISEASE OF LARGE INTESTINE WITHOUT COMPLICATION: ICD-10-CM

## 2025-05-12 PROCEDURE — 96413 CHEMO IV INFUSION 1 HR: CPT | Performed by: INTERNAL MEDICINE

## 2025-05-12 RX ORDER — ONDANSETRON 4 MG/1
1 TABLET ON THE TONGUE AND ALLOW TO DISSOLVE TABLET, ORALLY DISINTEGRATING ORAL ONCE A DAY
Qty: 30 | Refills: 3 | Status: ACTIVE | COMMUNITY
Start: 2023-05-18

## 2025-05-12 RX ORDER — TIZANIDINE 4 MG/1
TAKE ONE TABLET BY MOUTH AT BEDTIME TABLET ORAL
Qty: 90 | Refills: 0 | Status: ACTIVE | COMMUNITY

## 2025-05-12 RX ORDER — LISINOPRIL 10 MG/1
TAKE ONE TABLET BY MOUTH ONE TIME DAILY TABLET ORAL
Qty: 90 | Refills: 0 | Status: ACTIVE | COMMUNITY

## 2025-05-12 RX ORDER — HYDROCODONE BITARTRATE AND ACETAMINOPHEN 7.5; 325 MG/1; MG/1
TAKE ONE TABLET BY MOUTH EVERY 4 HOURS AS NEEDED PAIN TABLET ORAL
Qty: 15 | Refills: 0 | Status: ACTIVE | COMMUNITY

## 2025-05-12 RX ORDER — HYDROCHLOROTHIAZIDE 25 MG/1
1 TABLET IN THE MORNING TABLET ORAL ONCE A DAY
Status: ACTIVE | COMMUNITY

## 2025-05-12 RX ORDER — IBUPROFEN 800 MG/1
TAKE ONE TABLET BY MOUTH EVERY 8 HOURS AS NEEDED TABLET, FILM COATED ORAL
Qty: 15 | Refills: 0 | Status: ACTIVE | COMMUNITY

## 2025-05-12 RX ORDER — AMITRIPTYLINE HYDROCHLORIDE 10 MG/1
1 TABLET AT BEDTIME TABLET, FILM COATED ORAL ONCE A DAY
Qty: 30 | Status: ACTIVE | COMMUNITY

## 2025-05-12 RX ORDER — RISANKIZUMAB-RZAA 360 MG/2.4
AT WEEK 12 WEARABLE INJECTOR SUBCUTANEOUS
Qty: 360 | Refills: 0 | Status: ACTIVE | COMMUNITY
Start: 2025-02-26

## 2025-05-12 RX ORDER — ONDANSETRON 4 MG/1
1 TABLET ON THE TONGUE AND ALLOW TO DISSOLVE TABLET, ORALLY DISINTEGRATING ORAL
Qty: 30 TABLET | Refills: 0 | Status: ACTIVE | COMMUNITY
Start: 2022-08-31

## 2025-05-12 RX ORDER — UPADACITINIB 30 MG/1
1 TABLET TABLET, EXTENDED RELEASE ORAL ONCE A DAY
Status: ACTIVE | COMMUNITY

## 2025-05-12 RX ORDER — PANTOPRAZOLE SODIUM 40 MG/1
TAKE ONE TABLET BY MOUTH TWICE A DAY TABLET, DELAYED RELEASE ORAL
Qty: 60 TABLET | Refills: 6 | Status: ACTIVE | COMMUNITY

## 2025-05-12 RX ORDER — CYCLOBENZAPRINE HYDROCHLORIDE 5 MG/1
TAKE ONCE DAILY PRN SIDE/BACK PAIN TABLET, FILM COATED ORAL ONCE A DAY
Qty: 30 | Refills: 1 | Status: ACTIVE | COMMUNITY

## 2025-05-12 RX ORDER — AMITRIPTYLINE HYDROCHLORIDE 10 MG/1
1 TABLET AT BEDTIME TABLET, FILM COATED ORAL ONCE A DAY
Qty: 30 | Refills: 11 | Status: ACTIVE | COMMUNITY
Start: 2023-06-12

## 2025-05-12 RX ORDER — RISANKIZUMAB-RZAA 60 MG/ML
AS DIRECTED INJECTION INTRAVENOUS
Qty: 600 | Refills: 0 | Status: ACTIVE | COMMUNITY
Start: 2025-02-26

## 2025-05-23 ENCOUNTER — TELEPHONE ENCOUNTER (OUTPATIENT)
Dept: URBAN - NONMETROPOLITAN AREA CLINIC 2 | Facility: CLINIC | Age: 40
End: 2025-05-23

## 2025-05-23 ENCOUNTER — OFFICE VISIT (OUTPATIENT)
Dept: URBAN - NONMETROPOLITAN AREA CLINIC 2 | Facility: CLINIC | Age: 40
End: 2025-05-23
Payer: COMMERCIAL

## 2025-05-23 DIAGNOSIS — D84.9 IMMUNOSUPPRESSED STATUS: ICD-10-CM

## 2025-05-23 DIAGNOSIS — K50.111 CROHN'S DISEASE OF COLON WITH RECTAL BLEEDING: ICD-10-CM

## 2025-05-23 DIAGNOSIS — R74.8 ELEVATED LIVER ENZYMES: ICD-10-CM

## 2025-05-23 PROCEDURE — 99214 OFFICE O/P EST MOD 30 MIN: CPT | Performed by: NURSE PRACTITIONER

## 2025-05-23 RX ORDER — ONDANSETRON 4 MG/1
1 TABLET ON THE TONGUE AND ALLOW TO DISSOLVE TABLET, ORALLY DISINTEGRATING ORAL ONCE A DAY
Qty: 30 | Refills: 3 | Status: ACTIVE | COMMUNITY
Start: 2023-05-18

## 2025-05-23 RX ORDER — AMITRIPTYLINE HYDROCHLORIDE 10 MG/1
1 TABLET AT BEDTIME TABLET, FILM COATED ORAL ONCE A DAY
Qty: 30 | Status: ACTIVE | COMMUNITY

## 2025-05-23 RX ORDER — RISANKIZUMAB-RZAA 360 MG/2.4
AT WEEK 12 WEARABLE INJECTOR SUBCUTANEOUS
Qty: 360 | Refills: 0 | Status: ACTIVE | COMMUNITY
Start: 2025-02-26

## 2025-05-23 RX ORDER — TIZANIDINE 4 MG/1
TAKE ONE TABLET BY MOUTH AT BEDTIME TABLET ORAL
Qty: 90 | Refills: 0 | Status: ACTIVE | COMMUNITY

## 2025-05-23 RX ORDER — LISINOPRIL 10 MG/1
TAKE ONE TABLET BY MOUTH ONE TIME DAILY TABLET ORAL
Qty: 90 | Refills: 0 | Status: ACTIVE | COMMUNITY

## 2025-05-23 RX ORDER — HYDROCODONE BITARTRATE AND ACETAMINOPHEN 7.5; 325 MG/1; MG/1
TAKE ONE TABLET BY MOUTH EVERY 4 HOURS AS NEEDED PAIN TABLET ORAL
Qty: 15 | Refills: 0 | Status: ACTIVE | COMMUNITY

## 2025-05-23 RX ORDER — RISANKIZUMAB-RZAA 60 MG/ML
AS DIRECTED INJECTION INTRAVENOUS
Qty: 600 | Refills: 0 | Status: ACTIVE | COMMUNITY
Start: 2025-02-26

## 2025-05-23 RX ORDER — PANTOPRAZOLE SODIUM 40 MG/1
TAKE ONE TABLET BY MOUTH TWICE A DAY TABLET, DELAYED RELEASE ORAL
Qty: 60 TABLET | Refills: 6 | Status: ACTIVE | COMMUNITY

## 2025-05-23 RX ORDER — IBUPROFEN 800 MG/1
TAKE ONE TABLET BY MOUTH EVERY 8 HOURS AS NEEDED TABLET, FILM COATED ORAL
Qty: 15 | Refills: 0 | Status: ACTIVE | COMMUNITY

## 2025-05-23 RX ORDER — HYDROCHLOROTHIAZIDE 25 MG/1
1 TABLET IN THE MORNING TABLET ORAL ONCE A DAY
Status: ACTIVE | COMMUNITY

## 2025-05-23 RX ORDER — UPADACITINIB 30 MG/1
1 TABLET TABLET, EXTENDED RELEASE ORAL ONCE A DAY
Status: ACTIVE | COMMUNITY

## 2025-05-23 RX ORDER — CYCLOBENZAPRINE HYDROCHLORIDE 5 MG/1
TAKE ONCE DAILY PRN SIDE/BACK PAIN TABLET, FILM COATED ORAL ONCE A DAY
Qty: 30 | Refills: 1 | Status: ACTIVE | COMMUNITY

## 2025-05-23 RX ORDER — AMITRIPTYLINE HYDROCHLORIDE 10 MG/1
1 TABLET AT BEDTIME TABLET, FILM COATED ORAL ONCE A DAY
Qty: 30 | Refills: 11 | Status: ACTIVE | COMMUNITY
Start: 2023-06-12

## 2025-05-23 NOTE — HPI-TODAY'S VISIT:
5/23/2025 Lance is a pleasant 40-year-old male who returns for follow-up of Crohn's.  He still having some occasional abdominal discomfort.  Abdominal discomfort on the right seems improved, but seems to have some new on the left.  Does seem to be improving with Skyrizi.  Is using the bathroom about 8 times a day.  Reports that he recently saw Dr. choi again.  She repeated a CT scan and he states that she did note some scarring and states that she would perform surgery if he had some weight loss but is currently trying to lose weight. Sb

## 2025-06-09 ENCOUNTER — OFFICE VISIT (OUTPATIENT)
Dept: URBAN - NONMETROPOLITAN AREA CLINIC 1 | Facility: CLINIC | Age: 40
End: 2025-06-09
Payer: COMMERCIAL

## 2025-06-09 DIAGNOSIS — K50.10 CROHN'S DISEASE OF LARGE INTESTINE WITHOUT COMPLICATION: ICD-10-CM

## 2025-06-09 PROCEDURE — 96413 CHEMO IV INFUSION 1 HR: CPT | Performed by: INTERNAL MEDICINE

## 2025-06-09 RX ORDER — PANTOPRAZOLE SODIUM 40 MG/1
TAKE ONE TABLET BY MOUTH TWICE A DAY TABLET, DELAYED RELEASE ORAL
Qty: 60 TABLET | Refills: 6 | Status: ACTIVE | COMMUNITY

## 2025-06-09 RX ORDER — ONDANSETRON 4 MG/1
1 TABLET ON THE TONGUE AND ALLOW TO DISSOLVE TABLET, ORALLY DISINTEGRATING ORAL ONCE A DAY
Qty: 30 | Refills: 3 | Status: ACTIVE | COMMUNITY
Start: 2023-05-18

## 2025-06-09 RX ORDER — AMITRIPTYLINE HYDROCHLORIDE 10 MG/1
1 TABLET AT BEDTIME TABLET, FILM COATED ORAL ONCE A DAY
Qty: 30 | Refills: 11 | Status: ACTIVE | COMMUNITY
Start: 2023-06-12

## 2025-06-09 RX ORDER — CYCLOBENZAPRINE HYDROCHLORIDE 5 MG/1
TAKE ONCE DAILY PRN SIDE/BACK PAIN TABLET, FILM COATED ORAL ONCE A DAY
Qty: 30 | Refills: 1 | Status: ACTIVE | COMMUNITY

## 2025-06-09 RX ORDER — AMITRIPTYLINE HYDROCHLORIDE 10 MG/1
1 TABLET AT BEDTIME TABLET, FILM COATED ORAL ONCE A DAY
Qty: 30 | Status: ACTIVE | COMMUNITY

## 2025-06-09 RX ORDER — RISANKIZUMAB-RZAA 360 MG/2.4
AT WEEK 12 WEARABLE INJECTOR SUBCUTANEOUS
Qty: 360 | Refills: 0 | Status: ACTIVE | COMMUNITY
Start: 2025-02-26

## 2025-06-09 RX ORDER — IBUPROFEN 800 MG/1
TAKE ONE TABLET BY MOUTH EVERY 8 HOURS AS NEEDED TABLET, FILM COATED ORAL
Qty: 15 | Refills: 0 | Status: ACTIVE | COMMUNITY

## 2025-06-09 RX ORDER — HYDROCHLOROTHIAZIDE 25 MG/1
1 TABLET IN THE MORNING TABLET ORAL ONCE A DAY
Status: ACTIVE | COMMUNITY

## 2025-06-09 RX ORDER — RISANKIZUMAB-RZAA 60 MG/ML
AS DIRECTED INJECTION INTRAVENOUS
Qty: 600 | Refills: 0 | Status: ACTIVE | COMMUNITY
Start: 2025-02-26

## 2025-06-09 RX ORDER — LISINOPRIL 10 MG/1
TAKE ONE TABLET BY MOUTH ONE TIME DAILY TABLET ORAL
Qty: 90 | Refills: 0 | Status: ACTIVE | COMMUNITY

## 2025-06-09 RX ORDER — HYDROCODONE BITARTRATE AND ACETAMINOPHEN 7.5; 325 MG/1; MG/1
TAKE ONE TABLET BY MOUTH EVERY 4 HOURS AS NEEDED PAIN TABLET ORAL
Qty: 15 | Refills: 0 | Status: ACTIVE | COMMUNITY

## 2025-06-09 RX ORDER — UPADACITINIB 30 MG/1
1 TABLET TABLET, EXTENDED RELEASE ORAL ONCE A DAY
Status: ACTIVE | COMMUNITY

## 2025-06-09 RX ORDER — TIZANIDINE 4 MG/1
TAKE ONE TABLET BY MOUTH AT BEDTIME TABLET ORAL
Qty: 90 | Refills: 0 | Status: ACTIVE | COMMUNITY

## 2025-06-16 ENCOUNTER — WEB ENCOUNTER (OUTPATIENT)
Dept: URBAN - NONMETROPOLITAN AREA CLINIC 2 | Facility: CLINIC | Age: 40
End: 2025-06-16

## 2025-06-20 ENCOUNTER — WEB ENCOUNTER (OUTPATIENT)
Dept: URBAN - NONMETROPOLITAN AREA CLINIC 2 | Facility: CLINIC | Age: 40
End: 2025-06-20

## 2025-06-20 RX ORDER — AMOXICILLIN AND CLAVULANATE POTASSIUM 875; 125 MG/1; MG/1
1 TABLET TABLET, FILM COATED ORAL
Qty: 20 | Refills: 0 | OUTPATIENT
Start: 2025-06-23 | End: 2025-07-03

## 2025-07-26 ENCOUNTER — WEB ENCOUNTER (OUTPATIENT)
Dept: URBAN - NONMETROPOLITAN AREA CLINIC 2 | Facility: CLINIC | Age: 40
End: 2025-07-26

## 2025-07-28 ENCOUNTER — WEB ENCOUNTER (OUTPATIENT)
Dept: URBAN - NONMETROPOLITAN AREA CLINIC 2 | Facility: CLINIC | Age: 40
End: 2025-07-28

## 2025-07-28 RX ORDER — METRONIDAZOLE 500 MG/1
1 TABLET TABLET ORAL THREE TIMES A DAY
Qty: 42 TABLET | Refills: 0 | OUTPATIENT
Start: 2025-07-29 | End: 2025-08-12

## 2025-08-15 ENCOUNTER — OFFICE VISIT (OUTPATIENT)
Dept: URBAN - NONMETROPOLITAN AREA CLINIC 2 | Facility: CLINIC | Age: 40
End: 2025-08-15
Payer: COMMERCIAL

## 2025-08-15 DIAGNOSIS — K50.111 CROHN'S DISEASE OF COLON WITH RECTAL BLEEDING: ICD-10-CM

## 2025-08-15 DIAGNOSIS — R74.8 ELEVATED LIVER ENZYMES: ICD-10-CM

## 2025-08-15 DIAGNOSIS — D84.9 IMMUNOSUPPRESSED STATUS: ICD-10-CM

## 2025-08-15 PROCEDURE — 99214 OFFICE O/P EST MOD 30 MIN: CPT | Performed by: NURSE PRACTITIONER

## 2025-08-15 RX ORDER — HYDROCHLOROTHIAZIDE 25 MG/1
1 TABLET IN THE MORNING TABLET ORAL ONCE A DAY
Status: ACTIVE | COMMUNITY

## 2025-08-15 RX ORDER — IBUPROFEN 800 MG/1
TAKE ONE TABLET BY MOUTH EVERY 8 HOURS AS NEEDED TABLET, FILM COATED ORAL
Qty: 15 | Refills: 0 | Status: ACTIVE | COMMUNITY

## 2025-08-15 RX ORDER — AMITRIPTYLINE HYDROCHLORIDE 10 MG/1
1 TABLET AT BEDTIME TABLET, FILM COATED ORAL ONCE A DAY
Qty: 30 | Status: ACTIVE | COMMUNITY

## 2025-08-15 RX ORDER — TIZANIDINE 4 MG/1
TAKE ONE TABLET BY MOUTH AT BEDTIME TABLET ORAL
Qty: 90 | Refills: 0 | Status: ACTIVE | COMMUNITY

## 2025-08-15 RX ORDER — RISANKIZUMAB-RZAA 60 MG/ML
AS DIRECTED INJECTION INTRAVENOUS
Qty: 600 | Refills: 0 | Status: ACTIVE | COMMUNITY
Start: 2025-02-26

## 2025-08-15 RX ORDER — METRONIDAZOLE 500 MG/1
1 TABLET TABLET ORAL THREE TIMES A DAY
Qty: 42 TABLET | Refills: 0 | OUTPATIENT
Start: 2025-08-15 | End: 2025-08-29

## 2025-08-15 RX ORDER — AMITRIPTYLINE HYDROCHLORIDE 10 MG/1
1 TABLET AT BEDTIME TABLET, FILM COATED ORAL ONCE A DAY
Qty: 30 | Refills: 11 | Status: ACTIVE | COMMUNITY
Start: 2023-06-12

## 2025-08-15 RX ORDER — ONDANSETRON 4 MG/1
1 TABLET ON THE TONGUE AND ALLOW TO DISSOLVE TABLET, ORALLY DISINTEGRATING ORAL ONCE A DAY
Qty: 30 | Refills: 3 | Status: ACTIVE | COMMUNITY
Start: 2023-05-18

## 2025-08-15 RX ORDER — RISANKIZUMAB-RZAA 360 MG/2.4
AT WEEK 12 WEARABLE INJECTOR SUBCUTANEOUS
Qty: 360 | Refills: 0 | Status: ACTIVE | COMMUNITY
Start: 2025-02-26

## 2025-08-15 RX ORDER — UPADACITINIB 30 MG/1
1 TABLET TABLET, EXTENDED RELEASE ORAL ONCE A DAY
Status: ACTIVE | COMMUNITY

## 2025-08-15 RX ORDER — PANTOPRAZOLE SODIUM 40 MG/1
TAKE ONE TABLET BY MOUTH TWICE A DAY TABLET, DELAYED RELEASE ORAL
Qty: 60 TABLET | Refills: 6 | Status: ACTIVE | COMMUNITY

## 2025-08-15 RX ORDER — HYDROCODONE BITARTRATE AND ACETAMINOPHEN 7.5; 325 MG/1; MG/1
TAKE ONE TABLET BY MOUTH EVERY 4 HOURS AS NEEDED PAIN TABLET ORAL
Qty: 15 | Refills: 0 | Status: ACTIVE | COMMUNITY

## 2025-08-15 RX ORDER — CYCLOBENZAPRINE HYDROCHLORIDE 5 MG/1
TAKE ONCE DAILY PRN SIDE/BACK PAIN TABLET, FILM COATED ORAL ONCE A DAY
Qty: 30 | Refills: 1 | Status: ACTIVE | COMMUNITY

## 2025-08-15 RX ORDER — LISINOPRIL 10 MG/1
TAKE ONE TABLET BY MOUTH ONE TIME DAILY TABLET ORAL
Qty: 90 | Refills: 0 | Status: ACTIVE | COMMUNITY

## 2025-08-18 ENCOUNTER — TELEPHONE ENCOUNTER (OUTPATIENT)
Dept: URBAN - NONMETROPOLITAN AREA CLINIC 2 | Facility: CLINIC | Age: 40
End: 2025-08-18

## 2025-08-18 LAB
A/G RATIO: 1.7
ABSOLUTE BASOPHILS: 59
ABSOLUTE EOSINOPHILS: 451
ABSOLUTE LYMPHOCYTES: 1672
ABSOLUTE MONOCYTES: 570
ABSOLUTE NEUTROPHILS: 4647
ALBUMIN: 4.3
ALKALINE PHOSPHATASE: 53
ALT (SGPT): 31
AST (SGOT): 23
BASOPHILS: 0.8
BILIRUBIN, TOTAL: 0.5
BUN/CREATININE RATIO: (no result)
BUN: 12
C-REACTIVE PROTEIN, QUANT: 3.4
CALCIUM: 9.9
CARBON DIOXIDE, TOTAL: 24
CHLORIDE: 105
CREATININE: 0.94
EGFR: 106
EOSINOPHILS: 6.1
GLOBULIN, TOTAL: 2.5
GLUCOSE: 103
HEMATOCRIT: 47.9
HEMOGLOBIN: 16.2
LYMPHOCYTES: 22.6
MCH: 30.1
MCHC: 33.8
MCV: 89
MONOCYTES: 7.7
MPV: 9
NEUTROPHILS: 62.8
PLATELET COUNT: 374
POTASSIUM: 4.1
PROTEIN, TOTAL: 6.8
RDW: 12.6
RED BLOOD CELL COUNT: 5.38
SODIUM: 139
WHITE BLOOD CELL COUNT: 7.4